# Patient Record
Sex: MALE | Race: WHITE
[De-identification: names, ages, dates, MRNs, and addresses within clinical notes are randomized per-mention and may not be internally consistent; named-entity substitution may affect disease eponyms.]

---

## 2020-05-07 ENCOUNTER — HOSPITAL ENCOUNTER (EMERGENCY)
Dept: HOSPITAL 47 - EC | Age: 40
Discharge: HOME | End: 2020-05-07
Payer: COMMERCIAL

## 2020-05-07 VITALS — TEMPERATURE: 98 F | HEART RATE: 98 BPM | SYSTOLIC BLOOD PRESSURE: 120 MMHG | DIASTOLIC BLOOD PRESSURE: 79 MMHG

## 2020-05-07 VITALS — RESPIRATION RATE: 18 BRPM

## 2020-05-07 DIAGNOSIS — R10.31: Primary | ICD-10-CM

## 2020-05-07 DIAGNOSIS — Z87.19: ICD-10-CM

## 2020-05-07 DIAGNOSIS — R59.0: ICD-10-CM

## 2020-05-07 DIAGNOSIS — B19.20: ICD-10-CM

## 2020-05-07 DIAGNOSIS — Z90.89: ICD-10-CM

## 2020-05-07 DIAGNOSIS — Z98.890: ICD-10-CM

## 2020-05-07 DIAGNOSIS — F17.200: ICD-10-CM

## 2020-05-07 PROCEDURE — 96372 THER/PROPH/DIAG INJ SC/IM: CPT

## 2020-05-07 PROCEDURE — 99284 EMERGENCY DEPT VISIT MOD MDM: CPT

## 2020-05-07 PROCEDURE — 76882 US LMTD JT/FCL EVL NVASC XTR: CPT

## 2020-05-07 NOTE — US
EXAMINATION TYPE: US groin RT

 

DATE OF EXAM: 5/7/2020

 

COMPARISON: NONE

 

CLINICAL HISTORY: severe pain, possible hernia. Severe pain x 2 months intermittently. Constant pain 
x 1 day. Possible hernia per order. Appendectomy in 2000.

 

Scanned area of pain right groin. Two hypoechoic areas seen with hyperechoic centers in the right kai
in. #1 measures: 0.7 x 0.8 x 0.6 cm.

#2 measures: 1.5 x 0.9 x 0.4 cm.

Scanned left groin for comparison. 

 

No other findings by ultrasound at this time.

 

 

Technologist marks two prominent but benign-appearing right groin lymph nodes. No concerning mass or 
fluid collection. No bowel containing hernia noted.

 

 

 

IMPRESSION:  As above.

## 2020-05-07 NOTE — ED
Abdominal Pain HPI





- General


Chief Complaint: Abdominal Pain


Stated Complaint: Groin Pain


Time Seen by Provider: 05/07/20 19:27


Source: patient


Mode of arrival: wheelchair


Limitations: no limitations





- History of Present Illness


Initial Comments: 





Patient is a 39-year-old male presenting to the emergency Department with 

complaints of right-sided lower quadrant pain has been increasing throughout 

today.  Patient states he has had mild pain in the same area for many years 

secondary to a small hernia.  Patient states the last month he feels like the 

pain has been increasing after he is lifting heavy things at work.  Patient 

states then today he has been having severe right groin pain and it is not going

away.  He denies fever, chills, vomiting, diarrhea.  He does admit to history of

appendectomy, no other abdominal surgeries.  He has no other complaints.  Upon 

arrival to the ER, patient was slightly tachycardia otherwise normal vitals.





- Related Data


                                    Allergies











Allergy/AdvReac Type Severity Reaction Status Date / Time


 


No Known Allergies Allergy   Verified 05/07/20 19:25














Review of Systems


ROS Statement: 


Those systems with pertinent positive or pertinent negative responses have been 

documented in the HPI.





ROS Other: All systems not noted in ROS Statement are negative.





Past Medical History


Additional Past Medical History / Comment(s): HEP C.


History of Any Multi-Drug Resistant Organisms: None Reported


Additional Past Surgical History / Comment(s): orthopedic surgery


Past Psychological History: No Psychological Hx Reported


Smoking Status: Current every day smoker


Past Alcohol Use History: None Reported


Past Drug Use History: None Reported





General Exam





- General Exam Comments


Initial Comments: 





GENERAL: 


Patient was teary eyed during exam, in mild distress.





HEAD: 


Atraumatic, normocephalic.





EYES:


Pupils equal round and reactive to light, extraocular movements intact, sclera 

anicteric, conjunctiva are normal.





ENT: 


TMs normal, nares patent, oropharynx clear without exudates.  Moist mucous 

membranes.





NECK: 


Normal range of motion, supple without lymphadenopathy or JVD.





LUNGS:


 Breath sounds clear to auscultation bilaterally and equal.  No wheezes rales or

rhonchi.





HEART:


Regular rate and rhythm without murmurs, rubs or gallops.





ABDOMEN: 


No abdominal tenderness.  Soft, normoactive bowel sounds.  No guarding, no 

rebound.  No masses appreciated.





: Tenderness into the right groin, no scrotal swelling or erythema.





EXTREMITIES: 


Normal range of motion, no pitting or edema.  No clubbing or cyanosis.





NEUROLOGICAL: 


Cranial nerves II through XII grossly intact.  Normal speech, normal gait.





PSYCH:


Normal mood, normal affect.





SKIN:


 Warm, Dry, normal turgor, no rashes or lesions noted.


Limitations: no limitations





Course


                                   Vital Signs











  05/07/20 05/07/20





  19:21 21:56


 


Temperature 98.4 F 98 F


 


Pulse Rate 102 H 98


 


Respiratory 18 18





Rate  


 


Blood Pressure 114/80 120/79


 


O2 Sat by Pulse 99 99





Oximetry  














Medical Decision Making





- Medical Decision Making





Patient is a 39-year-old male here for severe right groin pain as been 

increasing throughout today.  He has a history of a mild hernia for many years, 

as well as appendectomy.  Vitals are stable.  Ultrasound reveals 2 benign-

appearing right groin lymph nodes, no concerning mass or fluid collection.  No 

bowel containing hernia noted.  He was given pain control.  He states his pain 

is improved.  I discussed with patient that he needs to follow-up with surgery 

for possible hernia repair.  He is in agreement this plan.  He is stable for 

discharge.  Case discussed with Dr. Darby. 





Disposition


Clinical Impression: 


 Right groin pain





Disposition: HOME SELF-CARE


Condition: Stable


Instructions (If sedation given, give patient instructions):  Inguinal Hernia 

(ED)


Additional Instructions: 


Please return to the Emergency Department if symptoms worsen or any other 

concerns.


Is patient prescribed a controlled substance at d/c from ED?: No


Referrals: 


None,Stated [Primary Care Provider] - 1-2 days


Matt Parker MD [Medical Doctor] - 1-2 days

## 2020-05-12 ENCOUNTER — HOSPITAL ENCOUNTER (EMERGENCY)
Dept: HOSPITAL 47 - EC | Age: 40
Discharge: HOME | End: 2020-05-12
Payer: COMMERCIAL

## 2020-05-12 VITALS — DIASTOLIC BLOOD PRESSURE: 73 MMHG | RESPIRATION RATE: 19 BRPM | SYSTOLIC BLOOD PRESSURE: 122 MMHG | HEART RATE: 89 BPM

## 2020-05-12 VITALS — TEMPERATURE: 97.9 F

## 2020-05-12 DIAGNOSIS — N45.1: Primary | ICD-10-CM

## 2020-05-12 DIAGNOSIS — F17.200: ICD-10-CM

## 2020-05-12 LAB
ALBUMIN SERPL-MCNC: 4.2 G/DL (ref 3.5–5)
ALP SERPL-CCNC: 88 U/L (ref 38–126)
ALT SERPL-CCNC: 77 U/L (ref 4–49)
ANION GAP SERPL CALC-SCNC: 7 MMOL/L
AST SERPL-CCNC: 43 U/L (ref 17–59)
BASOPHILS # BLD AUTO: 0.1 K/UL (ref 0–0.2)
BASOPHILS NFR BLD AUTO: 1 %
BUN SERPL-SCNC: 13 MG/DL (ref 9–20)
CALCIUM SPEC-MCNC: 9.5 MG/DL (ref 8.4–10.2)
CHLORIDE SERPL-SCNC: 105 MMOL/L (ref 98–107)
CO2 SERPL-SCNC: 24 MMOL/L (ref 22–30)
EOSINOPHIL # BLD AUTO: 0.2 K/UL (ref 0–0.7)
EOSINOPHIL NFR BLD AUTO: 2 %
ERYTHROCYTE [DISTWIDTH] IN BLOOD BY AUTOMATED COUNT: 4.75 M/UL (ref 4.3–5.9)
ERYTHROCYTE [DISTWIDTH] IN BLOOD: 12.7 % (ref 11.5–15.5)
GLUCOSE SERPL-MCNC: 184 MG/DL (ref 74–99)
HCT VFR BLD AUTO: 42.9 % (ref 39–53)
HGB BLD-MCNC: 14.8 GM/DL (ref 13–17.5)
LYMPHOCYTES # SPEC AUTO: 2.2 K/UL (ref 1–4.8)
LYMPHOCYTES NFR SPEC AUTO: 27 %
MCH RBC QN AUTO: 31.1 PG (ref 25–35)
MCHC RBC AUTO-ENTMCNC: 34.4 G/DL (ref 31–37)
MCV RBC AUTO: 90.3 FL (ref 80–100)
MONOCYTES # BLD AUTO: 0.4 K/UL (ref 0–1)
MONOCYTES NFR BLD AUTO: 5 %
NEUTROPHILS # BLD AUTO: 5.2 K/UL (ref 1.3–7.7)
NEUTROPHILS NFR BLD AUTO: 64 %
PH UR: 8 [PH] (ref 5–8)
PLATELET # BLD AUTO: 330 K/UL (ref 150–450)
POTASSIUM SERPL-SCNC: 3.8 MMOL/L (ref 3.5–5.1)
PROT SERPL-MCNC: 6.9 G/DL (ref 6.3–8.2)
RBC UR QL: 2 /HPF (ref 0–5)
SODIUM SERPL-SCNC: 136 MMOL/L (ref 137–145)
SP GR UR: 1.01 (ref 1–1.03)
UROBILINOGEN UR QL STRIP: <2 MG/DL (ref ?–2)
WBC # BLD AUTO: 8.1 K/UL (ref 3.8–10.6)
WBC # UR AUTO: 4 /HPF (ref 0–5)

## 2020-05-12 PROCEDURE — 76870 US EXAM SCROTUM: CPT

## 2020-05-12 PROCEDURE — 85025 COMPLETE CBC W/AUTO DIFF WBC: CPT

## 2020-05-12 PROCEDURE — 81001 URINALYSIS AUTO W/SCOPE: CPT

## 2020-05-12 PROCEDURE — 83690 ASSAY OF LIPASE: CPT

## 2020-05-12 PROCEDURE — 96374 THER/PROPH/DIAG INJ IV PUSH: CPT

## 2020-05-12 PROCEDURE — 74177 CT ABD & PELVIS W/CONTRAST: CPT

## 2020-05-12 PROCEDURE — 93975 VASCULAR STUDY: CPT

## 2020-05-12 PROCEDURE — 83605 ASSAY OF LACTIC ACID: CPT

## 2020-05-12 PROCEDURE — 96375 TX/PRO/DX INJ NEW DRUG ADDON: CPT

## 2020-05-12 PROCEDURE — 99284 EMERGENCY DEPT VISIT MOD MDM: CPT

## 2020-05-12 PROCEDURE — 80053 COMPREHEN METABOLIC PANEL: CPT

## 2020-05-12 PROCEDURE — 36415 COLL VENOUS BLD VENIPUNCTURE: CPT

## 2020-05-12 PROCEDURE — 96361 HYDRATE IV INFUSION ADD-ON: CPT

## 2020-05-12 NOTE — CT
EXAMINATION TYPE: CT abdomen pelvis w con

 

DATE OF EXAM: 5/12/2020

 

COMPARISON: None

 

HISTORY: Abdominal pain x1 year

 

CT DLP: 743.9 mGycm

Automated exposure control for dose reduction was used.

 

CONTRAST: 

Performed with IV Contrast, patient injected with 100 mL of Isovue 300.

 

Lung bases are clear. There is no pleural effusion. Heart size is normal. There is no pericardial eff
usion.

 

Liver spleen stomach gallbladder pancreas appear normal. Bile ducts are not dilated.

 

There is no adrenal mass. Kidneys show satisfactory contrast opacification. There is no hydronephrosi
s. There is normal excretion on the delayed images. Bladder distends smoothly. There is no inguinal h
ernia. There is no free fluid in the pelvis.

 

There is no mesenteric edema. There is no ascites or free air. There is no sign of a bowel obstructio
n. Appendix is not seen. There is no sign of thickened appendix.

 

The lumbar vertebra have normal spacing and alignment. Posterior elements are intact. There is no com
pression fracture. The bony pelvis appears intact.

 

IMPRESSION:

Negative CT scan abdomen and pelvis.

## 2020-05-12 NOTE — ED
Abdominal Pain HPI





- General


Chief Complaint: Abdominal Pain


Stated Complaint: Abdominal Pain


Time Seen by Provider: 05/12/20 19:36


Source: patient, RN notes reviewed


Mode of arrival: wheelchair


Limitations: no limitations





- History of Present Illness


Initial Comments: 





39-year-old male presents emergency Department chief complaint of severe right 

testicular pain, right groin pain.  Patient states she was seen here few days 

ago was told that he had a hernia.  Patient states that today he's had worsening

waxing and waning pain.  Patient states that he has no dysuria no hematuria 

denies any history kidney stone.  Patient states she has some pain and rates up 

into his abdomen.  Denies any trauma today.  He's had a prior appendectomy 

return or who the surgeon was.  Patient denies any back pain.





- Related Data


                                  Previous Rx's











 Medication  Instructions  Recorded


 


Doxycycline Monohydrate [Monodox] 100 mg PO Q12HR #20 cap 05/12/20


 


Ibuprofen [Motrin] 600 mg PO Q8HR PRN #20 tab 05/12/20











                                    Allergies











Allergy/AdvReac Type Severity Reaction Status Date / Time


 


No Known Allergies Allergy   Verified 05/12/20 19:34














Review of Systems


ROS Statement: 


Those systems with pertinent positive or pertinent negative responses have been 

documented in the HPI.





ROS Other: All systems not noted in ROS Statement are negative.





Past Medical History


Additional Past Medical History / Comment(s): HEP C.


History of Any Multi-Drug Resistant Organisms: None Reported


Additional Past Surgical History / Comment(s): orthopedic surgery


Past Psychological History: No Psychological Hx Reported


Smoking Status: Current every day smoker


Past Alcohol Use History: None Reported


Past Drug Use History: None Reported





General Exam


Limitations: no limitations


General appearance: alert, in no apparent distress


Head exam: Present: atraumatic, normocephalic, normal inspection


Eye exam: Present: normal appearance, PERRL, EOMI.  Absent: scleral icterus, 

conjunctival injection, periorbital swelling


Respiratory exam: Present: normal lung sounds bilaterally.  Absent: respiratory 

distress, wheezes, rales, rhonchi, stridor


Cardiovascular Exam: Present: regular rate, normal rhythm, normal heart sounds. 

 Absent: systolic murmur, diastolic murmur, rubs, gallop, clicks


GI/Abdominal exam: Present: soft, tenderness, normal bowel sounds.  Absent: 

distended, guarding, rebound, rigid


 exam: Present: testicular tenderness (Right), scrotal swelling.  Absent: 

normal inspection


Back exam: Absent: CVA tenderness (R), CVA tenderness (L)


Neurological exam: Present: alert, oriented X3





Course


                                   Vital Signs











  05/12/20





  19:27


 


Temperature 97.9 F


 


Pulse Rate 102 H


 


Respiratory 20





Rate 


 


Blood Pressure 122/70


 


O2 Sat by Pulse 99





Oximetry 














Medical Decision Making





- Medical Decision Making





39-year-old male presented for severe right groin right testicular pain.  

Patient had recent ultrasound which was reviewed and negative for any acute 

findings in his groin.  Also scrotum shows enlarged epididymis otherwise normal 

blood flow.  CT obtained due to ongoing symptoms no acute findings.  Symptoms 

may related to epididymitis.  Patient will be provided antibiotics, pain control

 return parameters were discussed.





- Lab Data


Result diagrams: 


                                 05/12/20 19:56





                                 05/12/20 19:56


                                   Lab Results











  05/12/20 05/12/20 05/12/20 Range/Units





  19:56 19:56 19:56 


 


WBC  8.1    (3.8-10.6)  k/uL


 


RBC  4.75    (4.30-5.90)  m/uL


 


Hgb  14.8    (13.0-17.5)  gm/dL


 


Hct  42.9    (39.0-53.0)  %


 


MCV  90.3    (80.0-100.0)  fL


 


MCH  31.1    (25.0-35.0)  pg


 


MCHC  34.4    (31.0-37.0)  g/dL


 


RDW  12.7    (11.5-15.5)  %


 


Plt Count  330    (150-450)  k/uL


 


Neutrophils %  64    %


 


Lymphocytes %  27    %


 


Monocytes %  5    %


 


Eosinophils %  2    %


 


Basophils %  1    %


 


Neutrophils #  5.2    (1.3-7.7)  k/uL


 


Lymphocytes #  2.2    (1.0-4.8)  k/uL


 


Monocytes #  0.4    (0-1.0)  k/uL


 


Eosinophils #  0.2    (0-0.7)  k/uL


 


Basophils #  0.1    (0-0.2)  k/uL


 


Sodium   136 L   (137-145)  mmol/L


 


Potassium   3.8   (3.5-5.1)  mmol/L


 


Chloride   105   ()  mmol/L


 


Carbon Dioxide   24   (22-30)  mmol/L


 


Anion Gap   7   mmol/L


 


BUN   13   (9-20)  mg/dL


 


Creatinine   0.73   (0.66-1.25)  mg/dL


 


Est GFR (CKD-EPI)AfAm   >90   (>60 ml/min/1.73 sqM)  


 


Est GFR (CKD-EPI)NonAf   >90   (>60 ml/min/1.73 sqM)  


 


Glucose   184 H   (74-99)  mg/dL


 


Plasma Lactic Acid Jomar    1.9  (0.7-2.0)  mmol/L


 


Calcium   9.5   (8.4-10.2)  mg/dL


 


Total Bilirubin   0.5   (0.2-1.3)  mg/dL


 


AST   43   (17-59)  U/L


 


ALT   77 H   (4-49)  U/L


 


Alkaline Phosphatase   88   ()  U/L


 


Total Protein   6.9   (6.3-8.2)  g/dL


 


Albumin   4.2   (3.5-5.0)  g/dL


 


Lipase   84   ()  U/L


 


Urine Color     


 


Urine Appearance     (Clear)  


 


Urine pH     (5.0-8.0)  


 


Ur Specific Gravity     (1.001-1.035)  


 


Urine Protein     (Negative)  


 


Urine Glucose (UA)     (Negative)  


 


Urine Ketones     (Negative)  


 


Urine Blood     (Negative)  


 


Urine Nitrite     (Negative)  


 


Urine Bilirubin     (Negative)  


 


Urine Urobilinogen     (<2.0)  mg/dL


 


Ur Leukocyte Esterase     (Negative)  


 


Urine RBC     (0-5)  /hpf


 


Urine WBC     (0-5)  /hpf


 


Amorphous Sediment     (None)  /hpf














  05/12/20 Range/Units





  21:05 


 


WBC   (3.8-10.6)  k/uL


 


RBC   (4.30-5.90)  m/uL


 


Hgb   (13.0-17.5)  gm/dL


 


Hct   (39.0-53.0)  %


 


MCV   (80.0-100.0)  fL


 


MCH   (25.0-35.0)  pg


 


MCHC   (31.0-37.0)  g/dL


 


RDW   (11.5-15.5)  %


 


Plt Count   (150-450)  k/uL


 


Neutrophils %   %


 


Lymphocytes %   %


 


Monocytes %   %


 


Eosinophils %   %


 


Basophils %   %


 


Neutrophils #   (1.3-7.7)  k/uL


 


Lymphocytes #   (1.0-4.8)  k/uL


 


Monocytes #   (0-1.0)  k/uL


 


Eosinophils #   (0-0.7)  k/uL


 


Basophils #   (0-0.2)  k/uL


 


Sodium   (137-145)  mmol/L


 


Potassium   (3.5-5.1)  mmol/L


 


Chloride   ()  mmol/L


 


Carbon Dioxide   (22-30)  mmol/L


 


Anion Gap   mmol/L


 


BUN   (9-20)  mg/dL


 


Creatinine   (0.66-1.25)  mg/dL


 


Est GFR (CKD-EPI)AfAm   (>60 ml/min/1.73 sqM)  


 


Est GFR (CKD-EPI)NonAf   (>60 ml/min/1.73 sqM)  


 


Glucose   (74-99)  mg/dL


 


Plasma Lactic Acid Jomar   (0.7-2.0)  mmol/L


 


Calcium   (8.4-10.2)  mg/dL


 


Total Bilirubin   (0.2-1.3)  mg/dL


 


AST   (17-59)  U/L


 


ALT   (4-49)  U/L


 


Alkaline Phosphatase   ()  U/L


 


Total Protein   (6.3-8.2)  g/dL


 


Albumin   (3.5-5.0)  g/dL


 


Lipase   ()  U/L


 


Urine Color  Light Yellow  


 


Urine Appearance  Cloudy  (Clear)  


 


Urine pH  8.0  (5.0-8.0)  


 


Ur Specific Gravity  1.013  (1.001-1.035)  


 


Urine Protein  Negative  (Negative)  


 


Urine Glucose (UA)  Negative  (Negative)  


 


Urine Ketones  Negative  (Negative)  


 


Urine Blood  Negative  (Negative)  


 


Urine Nitrite  Negative  (Negative)  


 


Urine Bilirubin  Negative  (Negative)  


 


Urine Urobilinogen  <2.0  (<2.0)  mg/dL


 


Ur Leukocyte Esterase  Negative  (Negative)  


 


Urine RBC  2  (0-5)  /hpf


 


Urine WBC  4  (0-5)  /hpf


 


Amorphous Sediment  Rare H  (None)  /hpf














Disposition


Clinical Impression: 


 Right groin pain, Epididymitis





Disposition: HOME SELF-CARE


Condition: Stable


Instructions (If sedation given, give patient instructions):  Epididymitis (ED)


Additional Instructions: 


Please return to the Emergency Department if symptoms worsen or any other co

ncerns.


Prescriptions: 


Doxycycline Monohydrate [Monodox] 100 mg PO Q12HR #20 cap


Ibuprofen [Motrin] 600 mg PO Q8HR PRN #20 tab


 PRN Reason: Pain


Is patient prescribed a controlled substance at d/c from ED?: No


Referrals: 


None,Stated [Primary Care Provider] - 1-2 days


Time of Disposition: 21:53

## 2020-05-12 NOTE — US
EXAMINATION TYPE: US scrotum with doppler.  Grayscale and color Doppler Duplex imaging performed of t
jennifer scrotum.

 

DATE OF EXAM: 5/12/2020

 

COMPARISON: US Groin

 

CLINICAL HISTORY: Extreme right testicular pain. Extreme right testicular pain. Hx appendectomy.

 

 

EXAM MEASUREMENTS:

 

TESTICLES:

Right Testicle:  4.8 x 3.3 x 2.2 cm

Left Testicle:  4.6 x 3.1 x 2.4 cm

 

EPIDIDYMIS HEAD:

Right Epididymis:  1.2 x 1.5 x 0.8 cm

Left Epididymis:  1.2 x 1.7 x 0.7 cm  

 

Doppler performed to assess for testicular vascularity; bilateral color flow and waveforms are seen.

 

Presence of hydroceles:  Right measuring: 1.2 x 0.9 x 0.7 cm. 

Presence of varicoceles:  Vessels measure up to 1.5 mm on the right and 2.5 mm on the left.

 

Left testicle appears hyperechoic in comparison to right testicle.

Epididymis seen laterally appears enlarged and heterogeneous bilaterally.

Testicles appear slightly enlarged bilaterally.

 

 

 

 

 

IMPRESSION:

No testicular torsion or mass. Left and right epididymis are upper limit of normal size. I do not see
 an asymmetric abnormality. Epididymitis not excluded.

## 2020-07-26 ENCOUNTER — HOSPITAL ENCOUNTER (EMERGENCY)
Dept: HOSPITAL 47 - EC | Age: 40
Discharge: HOME | End: 2020-07-26
Payer: COMMERCIAL

## 2020-07-26 VITALS
DIASTOLIC BLOOD PRESSURE: 70 MMHG | RESPIRATION RATE: 16 BRPM | TEMPERATURE: 98.6 F | SYSTOLIC BLOOD PRESSURE: 108 MMHG | HEART RATE: 98 BPM

## 2020-07-26 DIAGNOSIS — M25.562: ICD-10-CM

## 2020-07-26 DIAGNOSIS — S89.92XA: Primary | ICD-10-CM
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
## 2020-07-26 PROCEDURE — 73562 X-RAY EXAM OF KNEE 3: CPT

## 2020-07-26 PROCEDURE — 99283 EMERGENCY DEPT VISIT LOW MDM: CPT

## 2020-07-26 PROCEDURE — 96372 THER/PROPH/DIAG INJ SC/IM: CPT

## 2020-07-26 NOTE — ED
General Adult HPI





- General


Chief complaint: Extremity Injury, Lower


Stated complaint: [facility] - lt knee injury


Time Seen by Provider: 07/26/20 18:44


Source: patient, RN notes reviewed


Mode of arrival: ambulatory


Limitations: no limitations





- History of Present Illness


Initial comments: 





Patient is a pleasant 39-year-old male presenting to the emergency Department 

with left knee pain.  Patient was at work yesterday when he turned and had 

sudden discomfort of his left knee.  Patient did feel a pop.  Patient states 

walking down the steps today he felt a second pop.  Patient has had significant 

discomfort.  Discomfort is mild at rest but moderate to severe with movement of 

the knee.  No history of chronic knee problems.  No other area of injury or 

concern.  No swelling.





- Related Data


                                  Previous Rx's











 Medication  Instructions  Recorded


 


Doxycycline Monohydrate [Monodox] 100 mg PO Q12HR #20 cap 05/12/20


 


Ibuprofen [Motrin] 600 mg PO Q8HR PRN #20 tab 05/12/20











                                    Allergies











Allergy/AdvReac Type Severity Reaction Status Date / Time


 


No Known Allergies Allergy   Verified 07/26/20 18:32














Review of Systems


ROS Statement: 


Those systems with pertinent positive or pertinent negative responses have been 

documented in the HPI.





ROS Other: All systems not noted in ROS Statement are negative.


Constitutional: Denies: fever


Eyes: Denies: eye pain


ENT: Denies: ear pain


Respiratory: Denies: cough


Cardiovascular: Denies: chest pain


Endocrine: Denies: fatigue


Gastrointestinal: Denies: abdominal pain


Genitourinary: Denies: dysuria


Musculoskeletal: Reports: as per HPI, arthralgia.  Denies: back pain


Skin: Denies: rash


Neurological: Denies: weakness





Past Medical History


Additional Past Medical History / Comment(s): HEP C.


History of Any Multi-Drug Resistant Organisms: None Reported


Additional Past Surgical History / Comment(s): orthopedic surgery


Past Psychological History: No Psychological Hx Reported


Past Alcohol Use History: None Reported


Past Drug Use History: None Reported





General Exam


Limitations: no limitations


General appearance: alert, in no apparent distress


Head exam: Present: normocephalic


Eye exam: Present: normal appearance


Neck exam: Present: normal inspection


Respiratory exam: Present: normal lung sounds bilaterally


Cardiovascular Exam: Present: regular rate, normal rhythm


  ** Expanded


Peripheral pulses: 2+: Dorsalis Pedis (L)


GI/Abdominal exam: Present: soft.  Absent: tenderness


Extremities exam: Present: tenderness (Tenderness in the region of the medial 

meniscus.), other (Distally the extremity is neurovascular intact.).  Absent: 

full ROM (Limited range of motion left knee secondary to pain.), calf tenderness


Neurological exam: Present: alert.  Absent: motor sensory deficit


Psychiatric exam: Present: normal affect, normal mood


Skin exam: Present: normal color





Course


                                   Vital Signs











  07/26/20





  18:29


 


Temperature 98.6 F


 


Pulse Rate 98


 


Respiratory 16





Rate 


 


Blood Pressure 108/70


 


O2 Sat by Pulse 97





Oximetry 














Medical Decision Making





- Medical Decision Making





Patient reevaluated and updated.  Knee immobilizer ordered.





- Radiology Data


Radiology results: image reviewed (X-ray left knee reveals no acute process)





Disposition


Clinical Impression: 


 Knee injury





Disposition: HOME SELF-CARE


Condition: Stable


Instructions (If sedation given, give patient instructions):  Knee Pain (ED)


Additional Instructions: 


Please follow-up [facility] in the next day or 2 for recheck.  Over-the-counter Motrin 

as needed.  Ice to affected area.  Use knee immobilizer, provided.  If symptoms 

continue you will likely need further evaluation including orthopedic evaluation

 or further imaging.  Return for increased pain, swelling, fever, worsening or 

change in symptoms or other concerns.


Is patient prescribed a controlled substance at d/c from ED?: No


Referrals: 


Bismark Chavez DO [Medical Doctor] - 1-2 days


Time of Disposition: 20:07

## 2020-07-26 NOTE — XR
EXAMINATION TYPE: XR knee complete LT

 

DATE OF EXAM: 7/26/2020

 

COMPARISON: NONE

 

HISTORY: Knee pain

 

TECHNIQUE: 3 views

 

FINDINGS: I see no fracture nor dislocation. Joint spaces are normal. There is no sign of knee joint 
effusion.

 

IMPRESSION: Negative left knee exam.

## 2021-01-22 ENCOUNTER — HOSPITAL ENCOUNTER (EMERGENCY)
Dept: HOSPITAL 47 - EC | Age: 41
Discharge: HOME | End: 2021-01-22
Payer: COMMERCIAL

## 2021-01-22 VITALS
HEART RATE: 122 BPM | RESPIRATION RATE: 18 BRPM | TEMPERATURE: 98.4 F | DIASTOLIC BLOOD PRESSURE: 80 MMHG | SYSTOLIC BLOOD PRESSURE: 128 MMHG

## 2021-01-22 DIAGNOSIS — S50.02XA: Primary | ICD-10-CM
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
## 2021-01-22 DIAGNOSIS — F17.200: ICD-10-CM

## 2021-01-22 PROCEDURE — 99283 EMERGENCY DEPT VISIT LOW MDM: CPT

## 2021-01-22 NOTE — XR
EXAMINATION TYPE: XR elbow complete LT

 

DATE OF EXAM: 1/22/2021

 

COMPARISON: NONE

 

HISTORY: Pain

 

FINDINGS: 

Three views of the elbow demonstrate no pathologic joint effusion.  The osseous structures are intact
.  There is no acute fracture or dislocation.  Tiny olecranon spur.

 

 

IMPRESSION:

1. No acute fracture or dislocation.  If symptoms persist follow-up study in 7 to 10 days could be ob
tained.

## 2021-01-22 NOTE — ED
Upper Extremity HPI





- General


Chief Complaint: Extremity Injury, Upper


Stated Complaint: slip & fall/elbow pain


Time Seen by Provider: 01/22/21 08:53


Source: patient, RN notes reviewed


Mode of arrival: ambulatory


Limitations: no limitations





- History of Present Illness


Initial Comments: 


40-year-old male presents emergency Department with chief complaint of left 

elbow injury.  Patient states that he slipped on her driveway yesterday falling 

landing onto his left elbow on landscaping bricks.  Patient states that it was 

sore but has worsened throughout the night.  Patient states he bumped it on door

this morning which increases pain, swelling.  Patient is right-hand dominant no 

prior left elbow injuries.   denies any paresthesias no other injuries 

noted from his fall.








- Related Data


                                  Previous Rx's











 Medication  Instructions  Recorded


 


Doxycycline Monohydrate [Monodox] 100 mg PO Q12HR #20 cap 05/12/20


 


Ibuprofen [Motrin] 600 mg PO Q8HR PRN #20 tab 05/12/20


 


Ibuprofen [Motrin] 600 mg PO Q8HR PRN #20 tab 01/22/21











                                    Allergies











Allergy/AdvReac Type Severity Reaction Status Date / Time


 


No Known Allergies Allergy   Verified 01/22/21 08:53














Review of Systems


ROS Statement: 


Those systems with pertinent positive or pertinent negative responses have been 

documented in the HPI.





ROS Other: All systems not noted in ROS Statement are negative.





Past Medical History


Additional Past Medical History / Comment(s): HEP C.


History of Any Multi-Drug Resistant Organisms: None Reported


Past Surgical History: Appendectomy


Additional Past Surgical History / Comment(s): orthopedic surgery


Past Psychological History: No Psychological Hx Reported


Smoking Status: Current every day smoker


Past Alcohol Use History: Rare


Past Drug Use History: Marijuana





General Exam


Limitations: no limitations


General appearance: alert, in no apparent distress


Head exam: Present: atraumatic, normocephalic, normal inspection


Eye exam: Present: normal appearance, PERRL, EOMI.  Absent: scleral icterus, 

conjunctival injection, periorbital swelling


ENT exam: Present: normal exam, normal oropharynx, mucous membranes moist


Neck exam: Present: normal inspection, full ROM.  Absent: tenderness, 

meningismus, lymphadenopathy


Respiratory exam: Present: normal lung sounds bilaterally.  Absent: respiratory 

distress, wheezes, rales, rhonchi, stridor


Cardiovascular Exam: Present: normal rhythm, tachycardia, normal heart sounds.  

Absent: systolic murmur, diastolic murmur, rubs, gallop, clicks


Extremities exam: Present: other (Left elbow there is moderate swelling, severe 

times with palpation, neurovascular intact left arm with equal radial pulses 

Refill less than 2 seconds of all digits, patient has limited range of motion of

 the left elbow secondary to pain there is no tenderness at the left distal 

forearm or shoulder.)





Course


                                   Vital Signs











  01/22/21





  08:46


 


Temperature 98.4 F


 


Pulse Rate 122 H


 


Respiratory 18





Rate 


 


Blood Pressure 128/80


 


O2 Sat by Pulse 100





Oximetry 














Medical Decision Making





- Medical Decision Making





X-ray was reviewed and read by radiologist as no acute fracture.  Patient to be 

discharged with anti-inflammatories will follow-up with orthopedics on Monday if

 no improvement return parameters were discussed.





Disposition


Clinical Impression: 


 Fall, Left elbow contusion





Disposition: HOME SELF-CARE


Condition: Stable


Instructions (If sedation given, give patient instructions):  Contusion in 

Adults (ED)


Additional Instructions: 


Please return to the Emergency Department if symptoms worsen or any other 

concerns.


Prescriptions: 


Ibuprofen [Motrin] 600 mg PO Q8HR PRN #20 tab


 PRN Reason: Pain


Is patient prescribed a controlled substance at d/c from ED?: No


Referrals: 


None,Stated [Primary Care Provider] - 1-2 days


Magno Mueller MD [STAFF PHYSICIAN] - 1-2 days


Time of Disposition: 09:27

## 2021-03-03 ENCOUNTER — HOSPITAL ENCOUNTER (EMERGENCY)
Dept: HOSPITAL 47 - EC | Age: 41
Discharge: HOME | End: 2021-03-03
Payer: COMMERCIAL

## 2021-03-03 VITALS — SYSTOLIC BLOOD PRESSURE: 117 MMHG | HEART RATE: 85 BPM | DIASTOLIC BLOOD PRESSURE: 81 MMHG | TEMPERATURE: 98.8 F

## 2021-03-03 VITALS — RESPIRATION RATE: 18 BRPM

## 2021-03-03 DIAGNOSIS — F17.200: ICD-10-CM

## 2021-03-03 DIAGNOSIS — F12.90: ICD-10-CM

## 2021-03-03 DIAGNOSIS — N39.0: Primary | ICD-10-CM

## 2021-03-03 DIAGNOSIS — Z90.49: ICD-10-CM

## 2021-03-03 LAB
PH UR: 6.5 [PH] (ref 5–8)
RBC UR QL: 13 /HPF (ref 0–5)
SP GR UR: 1.02 (ref 1–1.03)
UROBILINOGEN UR QL STRIP: 2 MG/DL (ref ?–2)
WBC # UR AUTO: >182 /HPF (ref 0–5)

## 2021-03-03 PROCEDURE — 81001 URINALYSIS AUTO W/SCOPE: CPT

## 2021-03-03 PROCEDURE — 87086 URINE CULTURE/COLONY COUNT: CPT

## 2021-03-03 PROCEDURE — 99283 EMERGENCY DEPT VISIT LOW MDM: CPT

## 2021-03-03 PROCEDURE — 87591 N.GONORRHOEAE DNA AMP PROB: CPT

## 2021-03-03 PROCEDURE — 87491 CHLMYD TRACH DNA AMP PROBE: CPT

## 2021-03-03 PROCEDURE — 96372 THER/PROPH/DIAG INJ SC/IM: CPT

## 2021-03-03 NOTE — ED
General Adult HPI





- General


Chief complaint: Urogenital


Stated complaint: STD check


Time Seen by Provider: 03/03/21 08:18


Source: patient, RN notes reviewed


Mode of arrival: ambulatory


Limitations: no limitations





- History of Present Illness


Initial comments: 


40-year-old male presents emergency Department chief complaint of dysuria.  

Patient states started 4 days ago.  Patient states she does have some penile 

drainage.  Patient states it burns constantly.  Patient does admit to 

unprotected sex and concerned about possible STD including gonorrhea and 

chlamydia.  Patient denies any lesions or sores on his penis.  Patient denies 

any other complaints no abdominal pain no flank pain no fevers or chills.





- Related Data


                                  Previous Rx's











 Medication  Instructions  Recorded


 


Ciprofloxacin HCl [Cipro] 500 mg PO Q12HR #20 tablet 03/03/21











                                    Allergies











Allergy/AdvReac Type Severity Reaction Status Date / Time


 


No Known Allergies Allergy   Verified 03/03/21 09:01














Review of Systems


ROS Statement: 


Those systems with pertinent positive or pertinent negative responses have been 

documented in the HPI.





ROS Other: All systems not noted in ROS Statement are negative.





Past Medical History


Additional Past Medical History / Comment(s): HEP C.


History of Any Multi-Drug Resistant Organisms: None Reported


Past Surgical History: Appendectomy, Orthopedic Surgery


Additional Past Surgical History / Comment(s): orthopedic surgery


Past Psychological History: No Psychological Hx Reported


Smoking Status: Current every day smoker


Past Alcohol Use History: Rare


Past Drug Use History: Marijuana





General Exam


Limitations: no limitations


General appearance: alert, in no apparent distress


Head exam: Present: atraumatic, normocephalic, normal inspection


Eye exam: Present: normal appearance, PERRL, EOMI.  Absent: scleral icterus, 

conjunctival injection, periorbital swelling


Neck exam: Present: normal inspection.  Absent: tenderness, meningismus, 

lymphadenopathy


Respiratory exam: Present: normal lung sounds bilaterally.  Absent: respiratory 

distress, wheezes, rales, rhonchi, stridor


Cardiovascular Exam: Present: regular rate, normal rhythm, normal heart sounds. 

 Absent: systolic murmur, diastolic murmur, rubs, gallop, clicks


GI/Abdominal exam: Present: soft, normal bowel sounds.  Absent: distended, 

tenderness, guarding, rebound, rigid


 exam: Present: urethral discharge, circumcision, other (Mild swelling just 

proximal to the glans, tattoo noted).  Absent: testicular tenderness, scrotal 

swelling, vertical testicular lie


Neurological exam: Present: alert


Skin exam: Present: warm, dry, intact, normal color.  Absent: rash





Course


                                   Vital Signs











  03/03/21 03/03/21





  08:11 09:16


 


Temperature 98.5 F 


 


Pulse Rate 100 


 


Respiratory 18 18





Rate  


 


Blood Pressure 134/85 


 


O2 Sat by Pulse 98 





Oximetry  














Medical Decision Making





- Medical Decision Making





Patient's urinalysis shows large amount of bacteria.  Patient did have some 

unprotected sex and concern for STDs.  Patient we treated for gonorrhea chlamydi

a.  Patient will be given 1 g for Rocephin for possible urinary tract infection.

  Patient will be discharged on ciprofloxacin return parameters discussed.





- Lab Data


                                   Lab Results











  03/03/21 Range/Units





  08:44 


 


Urine Color  Yellow  


 


Urine Appearance  Cloudy  (Clear)  


 


Urine pH  6.5  (5.0-8.0)  


 


Ur Specific Gravity  1.024  (1.001-1.035)  


 


Urine Protein  Trace H  (Negative)  


 


Urine Glucose (UA)  Negative  (Negative)  


 


Urine Ketones  Negative  (Negative)  


 


Urine Blood  Small H  (Negative)  


 


Urine Nitrite  Negative  (Negative)  


 


Urine Bilirubin  Negative  (Negative)  


 


Urine Urobilinogen  2.0  (<2.0)  mg/dL


 


Ur Leukocyte Esterase  Large H  (Negative)  


 


Urine RBC  13 H  (0-5)  /hpf


 


Urine WBC  >182 H  (0-5)  /hpf


 


Urine WBC Clumps  Few H  (None)  /hpf


 


Urine Mucus  Occasional H  (None)  /hpf


 


Urine Sperm  Rare  (None)  /hpf














Disposition


Clinical Impression: 


 UTI (urinary tract infection)





Disposition: HOME SELF-CARE


Condition: Stable


Instructions (If sedation given, give patient instructions):  Urinary Tract 

Infection in Men (ED)


Additional Instructions: 


Please return to the Emergency Department if symptoms worsen or any other 

concerns.


Prescriptions: 


Ciprofloxacin HCl [Cipro] 500 mg PO Q12HR #20 tablet


Is patient prescribed a controlled substance at d/c from ED?: No


Referrals: 


None,Stated [Primary Care Provider] - 1-2 days


Time of Disposition: 09:38

## 2021-04-06 ENCOUNTER — HOSPITAL ENCOUNTER (EMERGENCY)
Dept: HOSPITAL 47 - EC | Age: 41
Discharge: HOME | End: 2021-04-06
Payer: COMMERCIAL

## 2021-04-06 VITALS — RESPIRATION RATE: 36 BRPM | HEART RATE: 120 BPM | TEMPERATURE: 98.1 F

## 2021-04-06 VITALS — DIASTOLIC BLOOD PRESSURE: 105 MMHG | SYSTOLIC BLOOD PRESSURE: 166 MMHG

## 2021-04-06 DIAGNOSIS — F12.90: ICD-10-CM

## 2021-04-06 DIAGNOSIS — R10.30: Primary | ICD-10-CM

## 2021-04-06 DIAGNOSIS — F17.200: ICD-10-CM

## 2021-04-06 LAB
ALBUMIN SERPL-MCNC: 4.7 G/DL (ref 3.5–5)
ALP SERPL-CCNC: 87 U/L (ref 38–126)
ALT SERPL-CCNC: 66 U/L (ref 4–49)
AMYLASE SERPL-CCNC: 36 U/L (ref 30–110)
ANION GAP SERPL CALC-SCNC: 11 MMOL/L
AST SERPL-CCNC: 40 U/L (ref 17–59)
BASOPHILS # BLD AUTO: 0.1 K/UL (ref 0–0.2)
BASOPHILS NFR BLD AUTO: 1 %
BUN SERPL-SCNC: 10 MG/DL (ref 9–20)
CALCIUM SPEC-MCNC: 9.6 MG/DL (ref 8.4–10.2)
CHLORIDE SERPL-SCNC: 107 MMOL/L (ref 98–107)
CO2 SERPL-SCNC: 19 MMOL/L (ref 22–30)
EOSINOPHIL # BLD AUTO: 0.1 K/UL (ref 0–0.7)
EOSINOPHIL NFR BLD AUTO: 1 %
ERYTHROCYTE [DISTWIDTH] IN BLOOD BY AUTOMATED COUNT: 5.11 M/UL (ref 4.3–5.9)
ERYTHROCYTE [DISTWIDTH] IN BLOOD: 12.3 % (ref 11.5–15.5)
GLUCOSE SERPL-MCNC: 107 MG/DL (ref 74–99)
HCT VFR BLD AUTO: 44.5 % (ref 39–53)
HGB BLD-MCNC: 15.8 GM/DL (ref 13–17.5)
LIPASE SERPL-CCNC: 57 U/L (ref 23–300)
LYMPHOCYTES # SPEC AUTO: 2.5 K/UL (ref 1–4.8)
LYMPHOCYTES NFR SPEC AUTO: 27 %
MCH RBC QN AUTO: 31 PG (ref 25–35)
MCHC RBC AUTO-ENTMCNC: 35.5 G/DL (ref 31–37)
MCV RBC AUTO: 87.2 FL (ref 80–100)
MONOCYTES # BLD AUTO: 0.7 K/UL (ref 0–1)
MONOCYTES NFR BLD AUTO: 7 %
NEUTROPHILS # BLD AUTO: 5.7 K/UL (ref 1.3–7.7)
NEUTROPHILS NFR BLD AUTO: 62 %
PH UR: 6.5 [PH] (ref 5–8)
PLATELET # BLD AUTO: 441 K/UL (ref 150–450)
POTASSIUM SERPL-SCNC: 4.3 MMOL/L (ref 3.5–5.1)
PROT SERPL-MCNC: 7.9 G/DL (ref 6.3–8.2)
SODIUM SERPL-SCNC: 137 MMOL/L (ref 137–145)
SP GR UR: 1 (ref 1–1.03)
UROBILINOGEN UR QL STRIP: <2 MG/DL (ref ?–2)
WBC # BLD AUTO: 9.3 K/UL (ref 3.8–10.6)

## 2021-04-06 PROCEDURE — 87591 N.GONORRHOEAE DNA AMP PROB: CPT

## 2021-04-06 PROCEDURE — 87491 CHLMYD TRACH DNA AMP PROBE: CPT

## 2021-04-06 PROCEDURE — 83690 ASSAY OF LIPASE: CPT

## 2021-04-06 PROCEDURE — 87661 TRICHOMONAS VAGINALIS AMPLIF: CPT

## 2021-04-06 PROCEDURE — 83605 ASSAY OF LACTIC ACID: CPT

## 2021-04-06 PROCEDURE — 99284 EMERGENCY DEPT VISIT MOD MDM: CPT

## 2021-04-06 PROCEDURE — 93975 VASCULAR STUDY: CPT

## 2021-04-06 PROCEDURE — 76870 US EXAM SCROTUM: CPT

## 2021-04-06 PROCEDURE — 96374 THER/PROPH/DIAG INJ IV PUSH: CPT

## 2021-04-06 PROCEDURE — 96375 TX/PRO/DX INJ NEW DRUG ADDON: CPT

## 2021-04-06 PROCEDURE — 82150 ASSAY OF AMYLASE: CPT

## 2021-04-06 PROCEDURE — 76882 US LMTD JT/FCL EVL NVASC XTR: CPT

## 2021-04-06 PROCEDURE — 80053 COMPREHEN METABOLIC PANEL: CPT

## 2021-04-06 PROCEDURE — 96376 TX/PRO/DX INJ SAME DRUG ADON: CPT

## 2021-04-06 PROCEDURE — 36415 COLL VENOUS BLD VENIPUNCTURE: CPT

## 2021-04-06 PROCEDURE — 85025 COMPLETE CBC W/AUTO DIFF WBC: CPT

## 2021-04-06 PROCEDURE — 81003 URINALYSIS AUTO W/O SCOPE: CPT

## 2021-04-06 NOTE — ED
General Adult HPI





- General


Chief complaint: Abdominal Pain


Stated complaint: Groin Pain


Time Seen by Provider: 04/06/21 06:50


Source: patient, RN notes reviewed


Mode of arrival: ambulatory


Limitations: no limitations





- History of Present Illness


Initial comments: 





40-year-old male with a past medical history of hepatitis C presents to the 

emergency room for a chief complaint of right groin pain.  Patient reports this 

is an ongoing for several months.  Patient states it worsened in the past few 

days.  States that he went to [facility] and they told him nothing was wrong.  

Patient rates it does radiate into his right testicle.  States walking worsens 

his pain.  Patient was recently treated for gonorrhea and had a confirmed 

negative test result.  States he has not been with that partner again.Patient 

has no other complaints at this time including shortness of breath, chest pain, 

nausea or vomiting, headache, or visual changes.





- Related Data


                                Home Medications











 Medication  Instructions  Recorded  Confirmed


 


No Known Home Medications  04/06/21 04/06/21











                                    Allergies











Allergy/AdvReac Type Severity Reaction Status Date / Time


 


No Known Allergies Allergy   Verified 04/06/21 08:04














Review of Systems


ROS Statement: 


Those systems with pertinent positive or pertinent negative responses have been 

documented in the HPI.





ROS Other: All systems not noted in ROS Statement are negative.





Past Medical History


Additional Past Medical History / Comment(s): HEP C.


History of Any Multi-Drug Resistant Organisms: None Reported


Past Surgical History: Appendectomy, Orthopedic Surgery


Additional Past Surgical History / Comment(s): orthopedic surgery


Past Psychological History: No Psychological Hx Reported


Smoking Status: Current every day smoker


Past Alcohol Use History: None Reported, Rare


Past Drug Use History: Marijuana





General Exam


Limitations: no limitations


General appearance: alert


Head exam: Present: atraumatic, normocephalic, normal inspection


Eye exam: Present: normal appearance, PERRL, EOMI.  Absent: scleral icterus, 

conjunctival injection, periorbital swelling


ENT exam: Present: normal exam, mucous membranes moist


Neck exam: Present: normal inspection.  Absent: tenderness, meningismus, 

lymphadenopathy


Respiratory exam: Present: normal lung sounds bilaterally.  Absent: respiratory 

distress, wheezes, rales, rhonchi, stridor


Cardiovascular Exam: Present: regular rate, normal rhythm, normal heart sounds. 

Absent: systolic murmur, diastolic murmur, rubs, gallop, clicks


GI/Abdominal exam: Present: soft, tenderness (Right groin tenderness.  No 

abdominal tenderness.), normal bowel sounds.  Absent: guarding, rebound


Neurological exam: Present: alert





Course


                                   Vital Signs











  04/06/21





  06:02


 


Temperature 98.1 F


 


Pulse Rate 120 H


 


Respiratory 36 H





Rate 


 


Blood Pressure 166/105


 


O2 Sat by Pulse 98





Oximetry 














Medical Decision Making





- Medical Decision Making





Vitals are stable.  Patient initially tachycardic and tachypneic likely 

secondary to pain, HPI and physical exam as documented.  Patient does have some 

groin and testicular tenderness without erythema edema.  No evidence of hernia 

on exam.  CBC CMP unremarkable.  Lactic acid is normal.  I did do ultrasounds of

the groin and scrotum which were both normal.  Patient was given pain 

medication.  I did order a CAT scan to further evaluate which patient refused 

once CAT scan tech came to get him.  He states the pain is gone.  He does not 

want any further evaluation.  requesting discharge.  I did discuss returning if 

he has any worsening symptoms and otherwise following up with primary care.





- Lab Data


Result diagrams: 


                                 04/06/21 07:51





                                 04/06/21 07:51


                                   Lab Results











  04/06/21 04/06/21 04/06/21 Range/Units





  07:51 07:51 07:51 


 


WBC  9.3    (3.8-10.6)  k/uL


 


RBC  5.11    (4.30-5.90)  m/uL


 


Hgb  15.8    (13.0-17.5)  gm/dL


 


Hct  44.5    (39.0-53.0)  %


 


MCV  87.2    (80.0-100.0)  fL


 


MCH  31.0    (25.0-35.0)  pg


 


MCHC  35.5    (31.0-37.0)  g/dL


 


RDW  12.3    (11.5-15.5)  %


 


Plt Count  441    (150-450)  k/uL


 


MPV  7.0    


 


Neutrophils %  62    %


 


Lymphocytes %  27    %


 


Monocytes %  7    %


 


Eosinophils %  1    %


 


Basophils %  1    %


 


Neutrophils #  5.7    (1.3-7.7)  k/uL


 


Lymphocytes #  2.5    (1.0-4.8)  k/uL


 


Monocytes #  0.7    (0-1.0)  k/uL


 


Eosinophils #  0.1    (0-0.7)  k/uL


 


Basophils #  0.1    (0-0.2)  k/uL


 


Sodium   137   (137-145)  mmol/L


 


Potassium   4.3   (3.5-5.1)  mmol/L


 


Chloride   107   ()  mmol/L


 


Carbon Dioxide   19 L   (22-30)  mmol/L


 


Anion Gap   11   mmol/L


 


BUN   10   (9-20)  mg/dL


 


Creatinine   0.81   (0.66-1.25)  mg/dL


 


Est GFR (CKD-EPI)AfAm   >90   (>60 ml/min/1.73 sqM)  


 


Est GFR (CKD-EPI)NonAf   >90   (>60 ml/min/1.73 sqM)  


 


Glucose   107 H   (74-99)  mg/dL


 


Plasma Lactic Acid Jomar    1.8  (0.7-2.0)  mmol/L


 


Calcium   9.6   (8.4-10.2)  mg/dL


 


Total Bilirubin   1.2   (0.2-1.3)  mg/dL


 


AST   40   (17-59)  U/L


 


ALT   66 H   (4-49)  U/L


 


Alkaline Phosphatase   87   ()  U/L


 


Total Protein   7.9   (6.3-8.2)  g/dL


 


Albumin   4.7   (3.5-5.0)  g/dL


 


Amylase   36   ()  U/L


 


Lipase   57   ()  U/L


 


Urine Color     


 


Urine Appearance     (Clear)  


 


Urine pH     (5.0-8.0)  


 


Ur Specific Gravity     (1.001-1.035)  


 


Urine Protein     (Negative)  


 


Urine Glucose (UA)     (Negative)  


 


Urine Ketones     (Negative)  


 


Urine Blood     (Negative)  


 


Urine Nitrite     (Negative)  


 


Urine Bilirubin     (Negative)  


 


Urine Urobilinogen     (<2.0)  mg/dL


 


Ur Leukocyte Esterase     (Negative)  














  04/06/21 Range/Units





  09:02 


 


WBC   (3.8-10.6)  k/uL


 


RBC   (4.30-5.90)  m/uL


 


Hgb   (13.0-17.5)  gm/dL


 


Hct   (39.0-53.0)  %


 


MCV   (80.0-100.0)  fL


 


MCH   (25.0-35.0)  pg


 


MCHC   (31.0-37.0)  g/dL


 


RDW   (11.5-15.5)  %


 


Plt Count   (150-450)  k/uL


 


MPV   


 


Neutrophils %   %


 


Lymphocytes %   %


 


Monocytes %   %


 


Eosinophils %   %


 


Basophils %   %


 


Neutrophils #   (1.3-7.7)  k/uL


 


Lymphocytes #   (1.0-4.8)  k/uL


 


Monocytes #   (0-1.0)  k/uL


 


Eosinophils #   (0-0.7)  k/uL


 


Basophils #   (0-0.2)  k/uL


 


Sodium   (137-145)  mmol/L


 


Potassium   (3.5-5.1)  mmol/L


 


Chloride   ()  mmol/L


 


Carbon Dioxide   (22-30)  mmol/L


 


Anion Gap   mmol/L


 


BUN   (9-20)  mg/dL


 


Creatinine   (0.66-1.25)  mg/dL


 


Est GFR (CKD-EPI)AfAm   (>60 ml/min/1.73 sqM)  


 


Est GFR (CKD-EPI)NonAf   (>60 ml/min/1.73 sqM)  


 


Glucose   (74-99)  mg/dL


 


Plasma Lactic Acid Jomar   (0.7-2.0)  mmol/L


 


Calcium   (8.4-10.2)  mg/dL


 


Total Bilirubin   (0.2-1.3)  mg/dL


 


AST   (17-59)  U/L


 


ALT   (4-49)  U/L


 


Alkaline Phosphatase   ()  U/L


 


Total Protein   (6.3-8.2)  g/dL


 


Albumin   (3.5-5.0)  g/dL


 


Amylase   ()  U/L


 


Lipase   ()  U/L


 


Urine Color  Light Yellow  


 


Urine Appearance  Clear  (Clear)  


 


Urine pH  6.5  (5.0-8.0)  


 


Ur Specific Gravity  1.003  (1.001-1.035)  


 


Urine Protein  Negative  (Negative)  


 


Urine Glucose (UA)  Negative  (Negative)  


 


Urine Ketones  Negative  (Negative)  


 


Urine Blood  Negative  (Negative)  


 


Urine Nitrite  Negative  (Negative)  


 


Urine Bilirubin  Negative  (Negative)  


 


Urine Urobilinogen  <2.0  (<2.0)  mg/dL


 


Ur Leukocyte Esterase  Negative  (Negative)  














Disposition


Clinical Impression: 


 Right groin pain





Disposition: HOME SELF-CARE


Condition: Good


Instructions (If sedation given, give patient instructions):  Groin Pain (ED)


Additional Instructions: 


Please follow up with primary care in 1-2 days.  Return to the emergency room 

for any worsening symptoms.


Is patient prescribed a controlled substance at d/c from ED?: No


Referrals: 


Sudeep Eller [STAFF PHYSICIAN] - 1-2 days


Time of Disposition: 10:49

## 2021-04-06 NOTE — US
EXAMINATION TYPE: US groin RT

 

DATE OF EXAM: 4/6/2021

 

COMPARISON: NONE

 

CLINICAL HISTORY: pain. Pain

 

Scanned area of pain no abnormalities seen. 

 

IMPRESSION:  

1. Negative ultrasound right groin region

## 2021-04-06 NOTE — US
EXAMINATION TYPE: US scrotum with doppler.  Grayscale and color Doppler Duplex imaging performed of t
jennifer scrotum.

 

DATE OF EXAM: 4/6/2021

 

COMPARISON: NONE

 

CLINICAL HISTORY: pain. pain

 

 

EXAM MEASUREMENTS:

 

TESTICLES:

Right Testicle:  3.2 x 2.8 x 3.5 cm

Left Testicle:  3.7 x 2.6 x 3.3  cm

 

EPIDIDYMIS HEAD:

Right Epididymis:  .9 x .6 x .8  cm

Left Epididymis:  Not well visualized   

 

Doppler performed to assess for testicular vascularity; good bilateral color flow and waveforms are s
een.   There is no evidence of testicular torsion.

 

Presence of hydroceles:  No

Presence of varicoceles:  No

 

 

 

 

 

IMPRESSION: 

1. Normal scrotal ultrasound

## 2021-09-25 ENCOUNTER — HOSPITAL ENCOUNTER (EMERGENCY)
Dept: HOSPITAL 47 - EC | Age: 41
Discharge: HOME | End: 2021-09-25
Payer: COMMERCIAL

## 2021-09-25 VITALS — DIASTOLIC BLOOD PRESSURE: 76 MMHG | HEART RATE: 89 BPM | TEMPERATURE: 98.3 F | SYSTOLIC BLOOD PRESSURE: 110 MMHG

## 2021-09-25 VITALS — RESPIRATION RATE: 18 BRPM

## 2021-09-25 DIAGNOSIS — N50.811: Primary | ICD-10-CM

## 2021-09-25 DIAGNOSIS — F17.200: ICD-10-CM

## 2021-09-25 DIAGNOSIS — F12.90: ICD-10-CM

## 2021-09-25 DIAGNOSIS — Z90.49: ICD-10-CM

## 2021-09-25 DIAGNOSIS — Z86.19: ICD-10-CM

## 2021-09-25 LAB
ALBUMIN SERPL-MCNC: 4.4 G/DL (ref 3.5–5)
ALP SERPL-CCNC: 128 U/L (ref 38–126)
ALT SERPL-CCNC: 75 U/L (ref 4–49)
ANION GAP SERPL CALC-SCNC: 6 MMOL/L
AST SERPL-CCNC: 49 U/L (ref 17–59)
BASOPHILS # BLD AUTO: 0.1 K/UL (ref 0–0.2)
BASOPHILS NFR BLD AUTO: 1 %
BUN SERPL-SCNC: 9 MG/DL (ref 9–20)
CALCIUM SPEC-MCNC: 9.6 MG/DL (ref 8.4–10.2)
CHLORIDE SERPL-SCNC: 101 MMOL/L (ref 98–107)
CO2 SERPL-SCNC: 29 MMOL/L (ref 22–30)
EOSINOPHIL # BLD AUTO: 0.3 K/UL (ref 0–0.7)
EOSINOPHIL NFR BLD AUTO: 3 %
ERYTHROCYTE [DISTWIDTH] IN BLOOD BY AUTOMATED COUNT: 5.09 M/UL (ref 4.3–5.9)
ERYTHROCYTE [DISTWIDTH] IN BLOOD: 12.5 % (ref 11.5–15.5)
GLUCOSE SERPL-MCNC: 102 MG/DL (ref 74–99)
HCT VFR BLD AUTO: 45.8 % (ref 39–53)
HGB BLD-MCNC: 15.6 GM/DL (ref 13–17.5)
LYMPHOCYTES # SPEC AUTO: 2.1 K/UL (ref 1–4.8)
LYMPHOCYTES NFR SPEC AUTO: 24 %
MCH RBC QN AUTO: 30.6 PG (ref 25–35)
MCHC RBC AUTO-ENTMCNC: 34 G/DL (ref 31–37)
MCV RBC AUTO: 90 FL (ref 80–100)
MONOCYTES # BLD AUTO: 0.6 K/UL (ref 0–1)
MONOCYTES NFR BLD AUTO: 7 %
NEUTROPHILS # BLD AUTO: 5.4 K/UL (ref 1.3–7.7)
NEUTROPHILS NFR BLD AUTO: 63 %
PH UR: 6.5 [PH] (ref 5–8)
PLATELET # BLD AUTO: 380 K/UL (ref 150–450)
POTASSIUM SERPL-SCNC: 4.1 MMOL/L (ref 3.5–5.1)
PROT SERPL-MCNC: 7.6 G/DL (ref 6.3–8.2)
RBC UR QL: <1 /HPF (ref 0–5)
SODIUM SERPL-SCNC: 136 MMOL/L (ref 137–145)
SP GR UR: 1.01 (ref 1–1.03)
SQUAMOUS UR QL AUTO: <1 /HPF (ref 0–4)
UROBILINOGEN UR QL STRIP: <2 MG/DL (ref ?–2)
WBC # BLD AUTO: 8.6 K/UL (ref 3.8–10.6)
WBC # UR AUTO: 3 /HPF (ref 0–5)

## 2021-09-25 PROCEDURE — 80053 COMPREHEN METABOLIC PANEL: CPT

## 2021-09-25 PROCEDURE — 36415 COLL VENOUS BLD VENIPUNCTURE: CPT

## 2021-09-25 PROCEDURE — 96374 THER/PROPH/DIAG INJ IV PUSH: CPT

## 2021-09-25 PROCEDURE — 93975 VASCULAR STUDY: CPT

## 2021-09-25 PROCEDURE — 76870 US EXAM SCROTUM: CPT

## 2021-09-25 PROCEDURE — 99284 EMERGENCY DEPT VISIT MOD MDM: CPT

## 2021-09-25 PROCEDURE — 96361 HYDRATE IV INFUSION ADD-ON: CPT

## 2021-09-25 PROCEDURE — 81001 URINALYSIS AUTO W/SCOPE: CPT

## 2021-09-25 PROCEDURE — 85025 COMPLETE CBC W/AUTO DIFF WBC: CPT

## 2021-09-25 PROCEDURE — 96375 TX/PRO/DX INJ NEW DRUG ADDON: CPT

## 2021-09-25 PROCEDURE — 76882 US LMTD JT/FCL EVL NVASC XTR: CPT

## 2021-09-25 NOTE — ED
General Adult HPI





- General


Chief complaint: Abdominal Pain


Stated complaint: Groin Pain


Time Seen by Provider: 09/25/21 18:03


Source: patient, RN notes reviewed, old records reviewed


Mode of arrival: wheelchair


Limitations: no limitations





- History of Present Illness


Initial comments: 





40-year-old male patient, alert and oriented 4 presents to the emergency room 

with right groin pain and right testicle pain.  Patient states that he has had 

this in the past and was told he had an inguinal hernia.  He states that the 

pain this time is constant he cannot get comfortable.  He also states that he 

found a left testicular lump couple of weeks ago that is nontender.  He denies 

any dysuria.  He denies any penile discharge or risk of sexually transmitted 

disease.  He denies any fevers or nausea and vomiting.


Location: genitals (Testicle and groin), right


Consistency: constant


Improves with: none


Worsens with: other (Palpation)


Associated Symptoms: denies other symptoms


Treatments Prior to Arrival: none





- Related Data


                                  Previous Rx's











 Medication  Instructions  Recorded


 


Ibuprofen [Motrin] 600 mg PO Q8HR PRN #30 tab 09/25/21











                                    Allergies











Allergy/AdvReac Type Severity Reaction Status Date / Time


 


No Known Allergies Allergy   Verified 09/25/21 16:50














Review of Systems


ROS Statement: 


Those systems with pertinent positive or pertinent negative responses have been 

documented in the HPI.





ROS Other: All systems not noted in ROS Statement are negative.





Past Medical History


Additional Past Medical History / Comment(s): HEP C.


History of Any Multi-Drug Resistant Organisms: None Reported


Past Surgical History: Appendectomy, Orthopedic Surgery


Additional Past Surgical History / Comment(s): orthopedic surgery


Past Psychological History: No Psychological Hx Reported


Smoking Status: Current every day smoker


Past Alcohol Use History: None Reported, Rare


Past Drug Use History: Marijuana





General Exam


Limitations: no limitations


General appearance: alert, in no apparent distress


Head exam: Present: atraumatic, normocephalic, normal inspection


Eye exam: Present: normal appearance, PERRL, EOMI.  Absent: scleral icterus, 

conjunctival injection, periorbital swelling


ENT exam: Present: normal exam, normal oropharynx, mucous membranes moist


Neck exam: Present: normal inspection, full ROM.  Absent: tenderness, 

meningismus, lymphadenopathy


Respiratory exam: Present: normal lung sounds bilaterally.  Absent: respiratory 

distress, wheezes, rales, rhonchi, stridor


Cardiovascular Exam: Present: regular rate, normal rhythm, normal heart sounds. 

 Absent: systolic murmur, diastolic murmur, rubs, gallop, clicks


GI/Abdominal exam: Present: soft, normal bowel sounds.  Absent: distended, 

tenderness, guarding, rebound, rigid


 exam: Present: normal inspection, testicular tenderness (Right testicle), 

circumcision, other (Left testicular mass).  Absent: urethral discharge


External exam: Present: normal external exam.  Absent: erythema, swelling


Extremities exam: Present: normal inspection, full ROM, normal capillary refill.

  Absent: tenderness, pedal edema, joint swelling, calf tenderness


Back exam: Present: normal inspection, full ROM.  Absent: tenderness


Neurological exam: Present: alert, oriented X3, CN II-XII intact


Psychiatric exam: Present: normal affect, normal mood, anxious


Skin exam: Present: warm, dry, intact, normal color.  Absent: rash, cyanosis, 

diaphoretic





Course


                                   Vital Signs











  09/25/21 09/25/21





  16:50 20:29


 


Temperature 98.2 F 98.3 F


 


Pulse Rate 92 89


 


Respiratory 18 18





Rate  


 


Blood Pressure 108/72 110/76


 


O2 Sat by Pulse 98 98





Oximetry  














Medical Decision Making





- Medical Decision Making





Ultrasound of the scrotum shows no testicular torsion or mass.  There is a mild 

right-sided hydrocele.  There is no evidence of hernia.  Ultrasound of the right

 groin shows an ordinary appearing right inguinal lymph node demonstrated 

measuring 3 x 0.7 x 2.1.  Patient again denies any risk of sexually transmitted 

diseases, denies any dysuria or penile discharge.  He has been afebrile.  He 

does state that he has some pain relief.  He was advised to wear supportive 

underwear and follow-up with urology, return to the emergency room with any new 

or worsening symptoms.  Case discussed with Dr. Hall





- Lab Data


Result diagrams: 


                                 09/25/21 19:07 09/25/21 19:07


                                   Lab Results











  09/25/21 09/25/21 09/25/21 Range/Units





  19:07 19:07 19:07 


 


WBC  8.6    (3.8-10.6)  k/uL


 


RBC  5.09    (4.30-5.90)  m/uL


 


Hgb  15.6    (13.0-17.5)  gm/dL


 


Hct  45.8    (39.0-53.0)  %


 


MCV  90.0    (80.0-100.0)  fL


 


MCH  30.6    (25.0-35.0)  pg


 


MCHC  34.0    (31.0-37.0)  g/dL


 


RDW  12.5    (11.5-15.5)  %


 


Plt Count  380    (150-450)  k/uL


 


MPV  7.1    


 


Neutrophils %  63    %


 


Lymphocytes %  24    %


 


Monocytes %  7    %


 


Eosinophils %  3    %


 


Basophils %  1    %


 


Neutrophils #  5.4    (1.3-7.7)  k/uL


 


Lymphocytes #  2.1    (1.0-4.8)  k/uL


 


Monocytes #  0.6    (0-1.0)  k/uL


 


Eosinophils #  0.3    (0-0.7)  k/uL


 


Basophils #  0.1    (0-0.2)  k/uL


 


Sodium    136 L  (137-145)  mmol/L


 


Potassium    4.1  (3.5-5.1)  mmol/L


 


Chloride    101  ()  mmol/L


 


Carbon Dioxide    29  (22-30)  mmol/L


 


Anion Gap    6  mmol/L


 


BUN    9  (9-20)  mg/dL


 


Creatinine    0.83  (0.66-1.25)  mg/dL


 


Est GFR (CKD-EPI)AfAm    >90  (>60 ml/min/1.73 sqM)  


 


Est GFR (CKD-EPI)NonAf    >90  (>60 ml/min/1.73 sqM)  


 


Glucose    102 H  (74-99)  mg/dL


 


Calcium    9.6  (8.4-10.2)  mg/dL


 


Total Bilirubin    0.6  (0.2-1.3)  mg/dL


 


AST    49  (17-59)  U/L


 


ALT    75 H  (4-49)  U/L


 


Alkaline Phosphatase    128 H  ()  U/L


 


Total Protein    7.6  (6.3-8.2)  g/dL


 


Albumin    4.4  (3.5-5.0)  g/dL


 


Urine Color   Light Yellow   


 


Urine Appearance   Cloudy   (Clear)  


 


Urine pH   6.5   (5.0-8.0)  


 


Ur Specific Gravity   1.007   (1.001-1.035)  


 


Urine Protein   Trace H   (Negative)  


 


Urine Glucose (UA)   Negative   (Negative)  


 


Urine Ketones   Negative   (Negative)  


 


Urine Blood   Negative   (Negative)  


 


Urine Nitrite   Negative   (Negative)  


 


Urine Bilirubin   Negative   (Negative)  


 


Urine Urobilinogen   <2.0   (<2.0)  mg/dL


 


Ur Leukocyte Esterase   Negative   (Negative)  


 


Urine RBC   <1   (0-5)  /hpf


 


Urine WBC   3   (0-5)  /hpf


 


Ur Squamous Epith Cells   <1   (0-4)  /hpf


 


Amorphous Sediment   Moderate H   (None)  /hpf


 


Urine Bacteria   Occasional H   (None)  /hpf


 


Urine Mucus   Many H   (None)  /hpf














Disposition


Clinical Impression: 


 Testicular pain, right





Disposition: HOME SELF-CARE


Condition: Good


Instructions (If sedation given, give patient instructions):  Testicle Pain (ED)


Additional Instructions: 


Use Motrin as prescribed and warm compresses.  Wear underwear with scrotal 

elevation, no boxers.  Follow-up with urology for the continuation of care.  

Return to the emergency room with any new or worsening symptoms including 

increased pain, fevers or difficulty urinating.


Prescriptions: 


Ibuprofen [Motrin] 600 mg PO Q8HR PRN #30 tab


 PRN Reason: Pain


Is patient prescribed a controlled substance at d/c from ED?: No


Referrals: 


None,Stated [Primary Care Provider] - 1-2 days


Reynaldo Springer MD [STAFF PHYSICIAN] - 1-2 days


Time of Disposition: 20:12

## 2021-09-25 NOTE — US
EXAMINATION TYPE: US scrotum with doppler.  Grayscale and color Doppler Duplex imaging performed of jeanmarie rodriguez scrotum.

 

DATE OF EXAM: 9/25/2021

 

COMPARISON: NONE

 

CLINICAL HISTORY: pain. intermittent right groin and testicle pain for 2 years 

 

 

EXAM MEASUREMENTS:

 

TESTICLES:

Right Testicle:  4.1 x 2.5 x 3.4 cm

Left Testicle:  4.2 x 2.5 x 3.5 cm

 

EPIDIDYMIS HEAD:

Right Epididymis:  1.4 cm

Left Epididymis:  1.1 cm  

 

Doppler performed to assess for testicular vascularity; good bilateral color flow and waveforms are s
een.   There is no evidence of testicular torsion.

 

Presence of hydroceles:  yes, fluid collection adjacent right testicle = 3.8cm

Presence of varicoceles:  no

 

 

 

 

 

IMPRESSION:

No testicular torsion or mass. There is a mild right-sided hydrocele.

## 2021-09-25 NOTE — US
EXAMINATION TYPE: US groin RT

 

DATE OF EXAM: 9/25/2021

 

COMPARISON: NONE

 

CLINICAL HISTORY: pain. intermittent right groin/testicle pain for 2 years 

 

lymph node right groin = 3.0 x 0.7 x 2.1cm.

 

 

 

 

 

IMPRESSION:     No evidence of a hernia. There is ordinary appearing right inguinal lymph node demons
trated.

## 2022-07-04 ENCOUNTER — HOSPITAL ENCOUNTER (EMERGENCY)
Dept: HOSPITAL 47 - EC | Age: 42
LOS: 1 days | Discharge: HOME | End: 2022-07-05
Payer: COMMERCIAL

## 2022-07-04 VITALS — TEMPERATURE: 98.5 F | RESPIRATION RATE: 18 BRPM

## 2022-07-04 DIAGNOSIS — F17.200: ICD-10-CM

## 2022-07-04 DIAGNOSIS — T40.1X1A: Primary | ICD-10-CM

## 2022-07-04 PROCEDURE — 99283 EMERGENCY DEPT VISIT LOW MDM: CPT

## 2022-07-05 VITALS — SYSTOLIC BLOOD PRESSURE: 113 MMHG | DIASTOLIC BLOOD PRESSURE: 68 MMHG | HEART RATE: 88 BPM

## 2022-07-05 NOTE — ED
Overdose HPI





- General


Chief Complaint: Overdose


Stated Complaint: Overdose


Time Seen by Provider: 07/04/22 23:28


Source: patient, EMS


Mode of arrival: EMS


Limitations: no limitations





- History of Present Illness


Initial Comments: 


41-year-old male who presents to the emergency department after he overdosed on 

heroin.  The patient reports that he was at a friend's house when he injected a 

small amount of heroin into his right forearm.  He was with a friend who went 

unresponsive.  Other friends called EMS.  They found the patient to be somnolent

however was able to be aroused.  En route to the hospital the patient had a 

little bit of decrease in his saturations and therefore they gave him 2 g of IV 

narcan.  He denies that he was trying to harm himself.  States that he just 

wanted to get high.  No CPR was performed.  Patient complaining of any pain.  No

other alleviating, precipitator modifying factors








- Related Data


                                Home Medications











 Medication  Instructions  Recorded  Confirmed


 


No Known Home Medications  07/06/22 07/07/22











                                    Allergies











Allergy/AdvReac Type Severity Reaction Status Date / Time


 


No Known Allergies Allergy   Verified 07/07/22 01:38














Review of Systems


ROS Statement: 


Those systems with pertinent positive or pertinent negative responses have been 

documented in the HPI.





ROS Other: All systems not noted in ROS Statement are negative.





Past Medical History


Additional Past Medical History / Comment(s): HEP C.


History of Any Multi-Drug Resistant Organisms: None Reported


Past Surgical History: Appendectomy, Orthopedic Surgery


Additional Past Surgical History / Comment(s): orthopedic surgery


Past Psychological History: No Psychological Hx Reported


Smoking Status: Current every day smoker


Past Alcohol Use History: None Reported, Rare


Past Drug Use History: Marijuana





General Exam


Limitations: no limitations


General appearance: alert, in no apparent distress, anxious


Head exam: Present: atraumatic, normocephalic, normal inspection


Eye exam: Present: normal appearance, PERRL, EOMI.  Absent: scleral icterus, 

conjunctival injection, periorbital swelling


ENT exam: Present: normal exam, mucous membranes moist


Neck exam: Present: normal inspection.  Absent: tenderness, meningismus, 

lymphadenopathy


Respiratory exam: Present: normal lung sounds bilaterally.  Absent: respiratory 

distress, wheezes, rales, rhonchi, stridor


Cardiovascular Exam: Present: normal rhythm, tachycardia, normal heart sounds.  

Absent: systolic murmur, diastolic murmur, rubs, gallop, clicks


GI/Abdominal exam: Present: soft, normal bowel sounds.  Absent: distended, 

tenderness, guarding, rebound, rigid


Extremities exam: Present: normal inspection, full ROM, normal capillary refill.

 Absent: tenderness, pedal edema, joint swelling, calf tenderness


Back exam: Present: normal inspection


Neurological exam: Present: alert, oriented X3, CN II-XII intact


Psychiatric exam: Present: normal affect, normal mood


Skin exam: Present: warm, dry, intact, normal color.  Absent: rash





Course


                                   Vital Signs











  07/04/22 07/05/22





  23:27 00:54


 


Temperature 98.5 F 


 


Pulse Rate 112 H 88


 


Respiratory 18 18





Rate  


 


Blood Pressure 133/95 113/68


 


O2 Sat by Pulse 100 94 L





Oximetry  














Medical Decision Making





- Medical Decision Making





Upon arrival patient was placed into room 2.  Patient is awake and alert.  He is

monitored for an hour and a half without any sedation.  Patient is comfortable 

for discharge home.  Instructed not to use any drugs.  Follow up with his doctor

and return for any new or worsening symptoms.  Patient was discharged home in 

stable condition





Disposition


Clinical Impression: 


 Accidental drug overdose





Disposition: HOME SELF-CARE


Condition: Stable


Instructions (If sedation given, give patient instructions):  Adult Overdose 

(ED)


Is patient prescribed a controlled substance at d/c from ED?: No


Referrals: 


None,Stated [Primary Care Provider] - 1-2 days


Time of Disposition: 00:36


Decision to Admit Reason: Admit from EC


Decision Date: 07/05/22


Decision Time: 00:36

## 2022-07-06 ENCOUNTER — HOSPITAL ENCOUNTER (INPATIENT)
Dept: HOSPITAL 47 - EC | Age: 42
LOS: 8 days | Discharge: NURSING, REHAB OR LONG-TERM CARE | DRG: 880 | End: 2022-07-14
Attending: PSYCHIATRY & NEUROLOGY | Admitting: PSYCHIATRY & NEUROLOGY
Payer: COMMERCIAL

## 2022-07-06 DIAGNOSIS — Z28.21: ICD-10-CM

## 2022-07-06 DIAGNOSIS — F11.20: ICD-10-CM

## 2022-07-06 DIAGNOSIS — F15.20: ICD-10-CM

## 2022-07-06 DIAGNOSIS — Z59.00: ICD-10-CM

## 2022-07-06 DIAGNOSIS — Z63.5: ICD-10-CM

## 2022-07-06 DIAGNOSIS — Z56.0: ICD-10-CM

## 2022-07-06 DIAGNOSIS — Z59.9: ICD-10-CM

## 2022-07-06 DIAGNOSIS — Z71.51: ICD-10-CM

## 2022-07-06 DIAGNOSIS — Z71.89: ICD-10-CM

## 2022-07-06 DIAGNOSIS — Z71.41: ICD-10-CM

## 2022-07-06 DIAGNOSIS — F17.210: ICD-10-CM

## 2022-07-06 DIAGNOSIS — Z65.3: ICD-10-CM

## 2022-07-06 DIAGNOSIS — F99: Primary | ICD-10-CM

## 2022-07-06 DIAGNOSIS — F41.9: ICD-10-CM

## 2022-07-06 DIAGNOSIS — Z79.899: ICD-10-CM

## 2022-07-06 DIAGNOSIS — T40.2X2A: ICD-10-CM

## 2022-07-06 DIAGNOSIS — F32.9: ICD-10-CM

## 2022-07-06 DIAGNOSIS — G47.00: ICD-10-CM

## 2022-07-06 PROCEDURE — 80061 LIPID PANEL: CPT

## 2022-07-06 PROCEDURE — 80053 COMPREHEN METABOLIC PANEL: CPT

## 2022-07-06 PROCEDURE — 99285 EMERGENCY DEPT VISIT HI MDM: CPT

## 2022-07-06 PROCEDURE — 81001 URINALYSIS AUTO W/SCOPE: CPT

## 2022-07-06 PROCEDURE — 87635 SARS-COV-2 COVID-19 AMP PRB: CPT

## 2022-07-06 PROCEDURE — 80306 DRUG TEST PRSMV INSTRMNT: CPT

## 2022-07-06 PROCEDURE — 82075 ASSAY OF BREATH ETHANOL: CPT

## 2022-07-06 PROCEDURE — 84443 ASSAY THYROID STIM HORMONE: CPT

## 2022-07-06 PROCEDURE — 85025 COMPLETE CBC W/AUTO DIFF WBC: CPT

## 2022-07-06 PROCEDURE — 83036 HEMOGLOBIN GLYCOSYLATED A1C: CPT

## 2022-07-06 SDOH — SOCIAL STABILITY - SOCIAL INSECURITY: DISRUPTION OF FAMILY BY SEPARATION AND DIVORCE: Z63.5

## 2022-07-06 SDOH — ECONOMIC STABILITY - INCOME SECURITY: PROBLEM RELATED TO HOUSING AND ECONOMIC CIRCUMSTANCES, UNSPECIFIED: Z59.9

## 2022-07-06 SDOH — ECONOMIC STABILITY - INCOME SECURITY: UNEMPLOYMENT, UNSPECIFIED: Z56.0

## 2022-07-06 SDOH — ECONOMIC STABILITY - HOUSING INSECURITY: HOMELESSNESS UNSPECIFIED: Z59.00

## 2022-07-06 NOTE — ED
General Adult HPI





- General


Source: RN notes reviewed, old records reviewed





<Zac Daigle - Last Filed: 07/07/22 01:03>





- General


Source: patient


Mode of arrival: ambulatory


Limitations: no limitations





<Landry Darby - Last Filed: 07/07/22 16:05>





- General


Chief complaint: Psychiatric Symptoms


Stated complaint: Mental Health


Time Seen by Provider: 07/06/22 20:53





- History of Present Illness


Initial comments: 


Dictation was produced using dragon dictation software. please excuse any 

grammatical, word or spelling errors. 











Chief Complaint: 41-year-old male presents emergency department for suicidal 

ideation





History of Present Illness: 41-year-old male presents emergency department for 

suicidal ideation.  Patient states that he's been feeling suicidal for long 

time.  Does not have any specific plan.  He was recently in the emergency 

department yesterday for similar issue.  Patient denies any homicidal ideation. 

No visual auditory hallucinations.  Patient denies any medical complaints.








The ROS documented in this emergency department record has been reviewed and 

confirmed by me.  Those systems with pertinent positive or negative responses 

have been documented in the HPI.  All other systems are other negative and/or 

noncontributory.








PHYSICAL EXAM:


General Impression: Alert and oriented x3, not in acute distress


HEENT: Normocephalic atraumatic, extra-ocular movements intact, pupils equal and

reactive to light bilaterally, mucous membranes moist.


Cardiovascular: Heart regular rate and rhythm


Chest: Able to complete full sentences, no retractions, no tachypnea


Abdomen: abdomen soft, non-tender, non-distended, no organomegaly


Musculoskeletal: Pulses present and equal in all extremities, no peripheral 

edema


Motor:  no focal deficits noted


Neurological: CN II-XII grossly intact, no focal motor or sensory deficits noted


Skin: Intact with no visualized rashes


Psych: Normal affect and mood





ED course: 41-year-old male presents to the emergency department for suicidal 

ideation.  Signs upon arrival are within acceptable limits.  Physical 

examination is benign.  Patient medically cleared for EPS.





Patient evaluated by EPS and admitted to inpatient psychiatric unit.


 (Landry Darby)





- Related Data


                                Home Medications











 Medication  Instructions  Recorded  Confirmed


 


No Known Home Medications  07/06/22 07/07/22











                                    Allergies











Allergy/AdvReac Type Severity Reaction Status Date / Time


 


No Known Allergies Allergy   Verified 07/07/22 01:38














Review of Systems


ROS Other: All systems not noted in ROS Statement are negative.





<Zac Daigle - Last Filed: 07/07/22 01:03>


ROS Other: All systems not noted in ROS Statement are negative.





<Landry Darby - Last Filed: 07/07/22 16:05>


ROS Statement: 


Those systems with pertinent positive or pertinent negative responses have been 

documented in the HPI.








Past Medical History


Additional Past Medical History / Comment(s): HEP C.


History of Any Multi-Drug Resistant Organisms: None Reported


Past Surgical History: Appendectomy, Orthopedic Surgery


Additional Past Surgical History / Comment(s): orthopedic surgery, ankles, 

shoudler


Past Psychological History: No Psychological Hx Reported


Smoking Status: Current every day smoker


Past Alcohol Use History: None Reported


Past Drug Use History: Heroin, Marijuana, Methamphetamine





<Landry Darby - Last Filed: 07/07/22 16:05>





General Exam


General appearance: alert, in no apparent distress


Head exam: Present: atraumatic, normocephalic, normal inspection


Eye exam: Present: normal appearance, PERRL, EOMI.  Absent: scleral icterus, 

conjunctival injection, periorbital swelling


ENT exam: Present: normal exam, mucous membranes moist


Neck exam: Present: normal inspection.  Absent: tenderness, meningismus, 

lymphadenopathy


Respiratory exam: Present: normal lung sounds bilaterally.  Absent: respiratory 

distress, wheezes, rales, rhonchi, stridor


Cardiovascular Exam: Present: regular rate, normal rhythm, normal heart sounds. 

Absent: systolic murmur, diastolic murmur, rubs, gallop, clicks


GI/Abdominal exam: Present: soft, normal bowel sounds.  Absent: distended, 

tenderness, guarding, rebound, rigid


Extremities exam: Present: normal inspection, full ROM, normal capillary refill.

 Absent: tenderness, pedal edema, joint swelling, calf tenderness


Back exam: Present: normal inspection


Neurological exam: Present: alert, oriented X3, CN II-XII intact


Psychiatric exam: Present: normal affect, normal mood


Skin exam: Present: warm, dry, intact, normal color.  Absent: rash





<Zac Daigle - Last Filed: 07/07/22 01:03>


Limitations: no limitations





<Bayudan,Landry D - Last Filed: 07/07/22 16:05>





Course





<Zac Daigle - Last Filed: 07/07/22 01:03>


                                   Vital Signs











  07/06/22 07/07/22 07/07/22





  20:36 00:41 02:05


 


Temperature 97.7 F  97.6 F


 


Pulse Rate 93  


 


Pulse Rate [   86





Left Sitting   





Pulse Oximetery   





]   


 


Respiratory 16  18





Rate   


 


Blood Pressure 102/74  


 


Blood Pressure   108/65





[Right Arm   





Sitting]   


 


O2 Sat by Pulse 100 100 97





Oximetry   














- Reevaluation(s)


Reevaluation #1: 





07/07/22 01:04


Medical records reviewed (Zac Daigle)


Reevaluation #2: 





07/07/22 01:04


medically clear for psychiatric evaluation


 (Zac Daigle)





Medical Decision Making





<Zac Daigle - Last Filed: 07/07/22 01:03>





- Medical Decision Making





41 male seen and evaluated by psychiatry, patient will be admitted for 

psychiatric evaluation and treatment (Zac Daigle)





- Lab Data


                                   Lab Results











  07/07/22 07/07/22 07/07/22 Range/Units





  00:25 00:25 00:27 


 


Urine Color  Yellow    


 


Urine Appearance  Clear    (Clear)  


 


Urine pH  6.5    (5.0-8.0)  


 


Ur Specific Gravity  1.015    (1.001-1.035)  


 


Urine Protein  Negative    (Negative)  


 


Urine Glucose (UA)  Negative    (Negative)  


 


Urine Ketones  Negative    (Negative)  


 


Urine Blood  Negative    (Negative)  


 


Urine Nitrite  Negative    (Negative)  


 


Urine Bilirubin  Negative    (Negative)  


 


Urine Urobilinogen  <2.0    (<2.0)  mg/dL


 


Ur Leukocyte Esterase  Trace H    (Negative)  


 


Urine RBC  <1    (0-5)  /hpf


 


Urine WBC  2    (0-5)  /hpf


 


Urine Mucus  Moderate H    (None)  /hpf


 


Urine Opiates Screen   Not Detected   (NotDetected)  


 


Ur Oxycodone Screen   Not Detected   (NotDetected)  


 


Urine Methadone Screen   Not Detected   (NotDetected)  


 


Ur Propoxyphene Screen   Not Detected   (NotDetected)  


 


Ur Barbiturates Screen   Not Detected   (NotDetected)  


 


U Tricyclic Antidepress   Not Detected   (NotDetected)  


 


Ur Phencyclidine Scrn   Not Detected   (NotDetected)  


 


Ur Amphetamines Screen   Detected H   (NotDetected)  


 


U Methamphetamines Scrn   Not Detected   (NotDetected)  


 


U Benzodiazepines Scrn   Not Detected   (NotDetected)  


 


Urine Cocaine Screen   Not Detected   (NotDetected)  


 


U Marijuana (THC) Screen   Detected H   (NotDetected)  


 


Coronavirus (PCR)    Not Detected  (Not Detectd)  














Disposition


Is patient prescribed a controlled substance at d/c from ED?: No





<Zac Daigle B - Last Filed: 07/07/22 01:03>





<Landry Darby - Last Filed: 07/07/22 16:05>


Clinical Impression: 


 Acute anxiety, Depression, Suicidal ideation





Disposition: TRANSFER TO PSYCH HOSP/UNIT


Condition: Fair

## 2022-07-07 LAB
PH UR: 6.5 [PH] (ref 5–8)
RBC UR QL: <1 /HPF (ref 0–5)
SP GR UR: 1.01 (ref 1–1.03)
UROBILINOGEN UR QL STRIP: <2 MG/DL (ref ?–2)
WBC #/AREA URNS HPF: 2 /HPF (ref 0–5)

## 2022-07-07 RX ADMIN — IBUPROFEN PRN MG: 600 TABLET ORAL at 20:29

## 2022-07-07 RX ADMIN — SERTRALINE HYDROCHLORIDE SCH MG: 50 TABLET, FILM COATED ORAL at 14:16

## 2022-07-07 RX ADMIN — ACETAMINOPHEN PRN MG: 325 TABLET, FILM COATED ORAL at 17:24

## 2022-07-07 RX ADMIN — IBUPROFEN PRN MG: 600 TABLET ORAL at 14:16

## 2022-07-07 NOTE — P.HP
Psychiatric H&P





- .


H&P Date: 07/07/22


History & Physical: 


                                    Allergies











Allergy/AdvReac Type Severity Reaction Status Date / Time


 


No Known Allergies Allergy   Verified 07/07/22 01:38








                                   Vital Signs











Temp  97.6 F   07/07/22 02:05


 


Pulse  86   07/07/22 02:05


 


Resp  18   07/07/22 02:05


 


BP  108/65   07/07/22 02:05


 


Pulse Ox  97   07/07/22 02:05


 


FiO2      








                                 Intake & Output











 07/06/22 07/07/22 07/07/22





 18:59 06:59 18:59


 


Weight  64.9 kg 








                             Laboratory Last Values











Urine Color  Yellow   07/07/22  00:25    


 


Urine Appearance  Clear  (Clear)   07/07/22  00:25    


 


Urine pH  6.5  (5.0-8.0)   07/07/22  00:25    


 


Ur Specific Gravity  1.015  (1.001-1.035)   07/07/22  00:25    


 


Urine Protein  Negative  (Negative)   07/07/22  00:25    


 


Urine Glucose (UA)  Negative  (Negative)   07/07/22  00:25    


 


Urine Ketones  Negative  (Negative)   07/07/22  00:25    


 


Urine Blood  Negative  (Negative)   07/07/22  00:25    


 


Urine Nitrite  Negative  (Negative)   07/07/22  00:25    


 


Urine Bilirubin  Negative  (Negative)   07/07/22  00:25    


 


Urine Urobilinogen  <2.0 mg/dL (<2.0)   07/07/22  00:25    


 


Ur Leukocyte Esterase  Trace  (Negative)  H  07/07/22  00:25    


 


Urine RBC  <1 /hpf (0-5)   07/07/22  00:25    


 


Urine WBC  2 /hpf (0-5)   07/07/22  00:25    


 


Urine Mucus  Moderate /hpf (None)  H  07/07/22  00:25    


 


Urine Opiates Screen  Not Detected  (NotDetected)   07/07/22  00:25    


 


Ur Oxycodone Screen  Not Detected  (NotDetected)   07/07/22  00:25    


 


Urine Methadone Screen  Not Detected  (NotDetected)   07/07/22  00:25    


 


Ur Propoxyphene Screen  Not Detected  (NotDetected)   07/07/22  00:25    


 


Ur Barbiturates Screen  Not Detected  (NotDetected)   07/07/22  00:25    


 


U Tricyclic Antidepress  Not Detected  (NotDetected)   07/07/22  00:25    


 


Ur Phencyclidine Scrn  Not Detected  (NotDetected)   07/07/22  00:25    


 


Ur Amphetamines Screen  Detected  (NotDetected)  H  07/07/22  00:25    


 


U Methamphetamines Scrn  Not Detected  (NotDetected)   07/07/22  00:25    


 


U Benzodiazepines Scrn  Not Detected  (NotDetected)   07/07/22  00:25    


 


Urine Cocaine Screen  Not Detected  (NotDetected)   07/07/22  00:25    


 


U Marijuana (THC) Screen  Detected  (NotDetected)  H  07/07/22  00:25    


 


Coronavirus (PCR)  Not Detected  (Not Detectd)   07/07/22  00:27    











07/07/22 14:28


IDENTIFYING DATA: Patient is a 41-year-old  male, currently homeless is

 and has one son.  Currently unemployed.





HPI: Patient presented to the hospital with depression and suicidal ideations.  

Patient was seen one day prior in the ER for a opioid overdose and came back to 

the ER the next day.  Patient was admitted voluntarily to mental health unit.  

[facility]  Patient

has multiple tattoos, appeared to be disheveled in appearance and was fairly 

cooperative during the interview.  He stated that he "is tired of losing 

everything" and spoke about going through a divorce and also financial 

difficulties.  He stated that he is also having legal and custody issues were 

paying child support to his ex-wife.  He states that he was extradited to 

[place] and has not been doing well here since.  He states that he feels he 

cannot find a job and does not have a phone.  He claims that he does have 

anhedonia and hopelessness.  He is stating that he has anxiety as well.  

Decreased sleep. fair Appetite. Patient denies any current suicidal or homicidal

ideations intent or plan.  At this time patient denies any auditory or visual 

hallucinations.  Patient denies any flight of ideas racing thoughts and 

increased in goal directed behavior.  Patient admits to using recreational drugs

including methamphetamine occasionally, cigarettes daily, heroin occasionally.





PAST PSYCHIATRIC HISTORY: Patient states that he has a history of polysubstance 

abuse and depression/anxiety. Patient denies being on any psychiatric 

medications.  He claims that he was last psychiatrically hospitalized in 2015 to

the mental health unit.  Patient denies any psychiatric outpatient follow-up.  

He claims that he cut his wrist several times in the past.





PMH: Hepatitis C





ALLERGIES: as per EMR





CHEMICAL DEPENDENCY HISTORY: as per HPI





FAMILY PSYCHIATRIC/SUBSTANCE USE HISTORY:  denies





SOCIAL HISTORY: Patient was born and raised in [place].  He states 

that he dropped out of school in the seventh grade however returned back to get 

his GED.  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 

carpentry.  He claims that he did go to shelter in the past due to child support 

nonpayment.  He claims that he is homeless.  He is .  He has one son.





MENTAL STATUS EXAM: 


General Appearance: Patient appears to be thin, disheveled, multiple tattoos, 

stated age is alert, directable, and attempts to cooperate. Patient appears to 

have poor hygiene and grooming.


Behavior: Patient is seated without any agitated behavior.  Poor eye contact.


Speech: Patient's speech is fluent and nonpressured.  Soft tone.


Mood/Affect: Patient reports their mood is depressed and anxious, affect is 

congruent and constricted. 


Suicidality/Homicidality:  Patient denies having any homicidal ideation intent 

or plan. Denies any suicidal ideations intent or plan  


Perceptions: Patient denies any visual hallucinations and denies any auditory 

hallucinations


Though content/process: There is no evidence of any delusional thought content 

and thought process is linear and goal-directed.  [place]


Memory and concentration: AOX3, grossly intact for the purposes of this session.

Can spell "WORLD" backwards


Judgment and insight: poor





STRENGTHS/WEAKNESSES: strength is that patient is resilient. Weakness is that 

patient has poor judgment and is impulsive





INTELLECT: average





IMPRESSIONS: 


Major depressive disorder, without psychotic features


Methamphetamine use disorder


Opioid use disorder


Nicotine dependence





PLAN: 


-Patient is admitted under voluntary status to MHU for stabilization of 

psychiatric symptoms and safety. Patient has signed adult voluntary form and 

medication consent and is placed in patient's chart. 


-Medications : Will start patient on Zoloft 50 mg daily for mood/anxiety, 

trazodone 50 mg daily at bedtime for mood/insomnia.


-Haldol and Vistaril PRN for agitation/aggression


-Patient was counselled on substance abuse and desired to cut back on use


-Patient was informed of the risks, benefits and side effects of the medication 

and patient verbally consented to taking the medications. Patient signed med 

consent form and was placed in chart.


-Internal Medicine consult to perform medical evaluation and physical.


-NRT - nicotine patch


-SW on board for discharge planning. Encourage patient to participate in groups 

to work on coping skills.  We'll speak with patient about possibly going to 

rehab versus shelter.

## 2022-07-08 LAB
ALBUMIN SERPL-MCNC: 4.3 G/DL (ref 3.5–5)
ALP SERPL-CCNC: 78 U/L (ref 38–126)
ALT SERPL-CCNC: 54 U/L (ref 4–49)
ANION GAP SERPL CALC-SCNC: 7 MMOL/L
AST SERPL-CCNC: 36 U/L (ref 17–59)
BASOPHILS # BLD AUTO: 0.1 K/UL (ref 0–0.2)
BASOPHILS NFR BLD AUTO: 1 %
BUN SERPL-SCNC: 18 MG/DL (ref 9–20)
CALCIUM SPEC-MCNC: 9.6 MG/DL (ref 8.4–10.2)
CHLORIDE SERPL-SCNC: 103 MMOL/L (ref 98–107)
CHOLEST SERPL-MCNC: 212 MG/DL (ref 0–200)
CO2 SERPL-SCNC: 30 MMOL/L (ref 22–30)
EOSINOPHIL # BLD AUTO: 0.1 K/UL (ref 0–0.7)
EOSINOPHIL NFR BLD AUTO: 2 %
ERYTHROCYTE [DISTWIDTH] IN BLOOD BY AUTOMATED COUNT: 5.15 M/UL (ref 4.3–5.9)
ERYTHROCYTE [DISTWIDTH] IN BLOOD: 13.1 % (ref 11.5–15.5)
GLUCOSE SERPL-MCNC: 91 MG/DL (ref 74–99)
HCT VFR BLD AUTO: 46.9 % (ref 39–53)
HDLC SERPL-MCNC: 44.3 MG/DL (ref 40–60)
HGB BLD-MCNC: 15.7 GM/DL (ref 13–17.5)
LDLC SERPL CALC-MCNC: 141.3 MG/DL (ref 0–131)
LYMPHOCYTES # SPEC AUTO: 1.3 K/UL (ref 1–4.8)
LYMPHOCYTES NFR SPEC AUTO: 23 %
MCH RBC QN AUTO: 30.5 PG (ref 25–35)
MCHC RBC AUTO-ENTMCNC: 33.5 G/DL (ref 31–37)
MCV RBC AUTO: 91.1 FL (ref 80–100)
MONOCYTES # BLD AUTO: 0.3 K/UL (ref 0–1)
MONOCYTES NFR BLD AUTO: 5 %
NEUTROPHILS # BLD AUTO: 3.7 K/UL (ref 1.3–7.7)
NEUTROPHILS NFR BLD AUTO: 67 %
PLATELET # BLD AUTO: 327 K/UL (ref 150–450)
POTASSIUM SERPL-SCNC: 5 MMOL/L (ref 3.5–5.1)
PROT SERPL-MCNC: 7.4 G/DL (ref 6.3–8.2)
SODIUM SERPL-SCNC: 140 MMOL/L (ref 137–145)
TRIGL SERPL-MCNC: 132 MG/DL (ref 0–149)
VLDLC SERPL CALC-MCNC: 26.4 MG/DL (ref 5–40)
WBC # BLD AUTO: 5.4 K/UL (ref 3.8–10.6)

## 2022-07-08 RX ADMIN — IBUPROFEN PRN MG: 600 TABLET ORAL at 20:14

## 2022-07-08 RX ADMIN — ACETAMINOPHEN PRN MG: 325 TABLET, FILM COATED ORAL at 17:44

## 2022-07-08 RX ADMIN — ACETAMINOPHEN PRN MG: 325 TABLET, FILM COATED ORAL at 11:10

## 2022-07-08 RX ADMIN — IBUPROFEN PRN MG: 600 TABLET ORAL at 08:22

## 2022-07-08 RX ADMIN — SERTRALINE HYDROCHLORIDE SCH MG: 50 TABLET, FILM COATED ORAL at 08:21

## 2022-07-08 NOTE — P.PN
Progress Note - Text


Progress Note Date: 07/08/22





Interval History:


Patient was seen wandering the hallways and was directable and agreeable to annamarie price with writer in the office.  Patient appears to have mildly improving hygiene

today.  He states that he is feeling very anxious today and claimed that he just

got off the phone with his brother.  He states that they argued a bit over the 

phone about what's causing his problems.  He states that his brother believes 

that it is mostly drug related.  He claims that he does have depression and try 

to kill himself.  He states that his mood is mildly better however still has 

severe anxiety at this time.  He claims that he has gone to some groups.  States

that his appetite is fair.  He claims that he did not sleep well last night due 

to several interruptions. At this time patient denies any suicidal or homical 

ideations, intent or plan. Patient denies any auditory, visual hallucinations 

and denies any paranoia or delusions. Patient denies any side effects from the 

medications and has been compliant with meds. 





Mental Status Exam:


General Appearance: Patient appears to be thin, disheveled, multiple tattoos, 

stated age is alert, directable, and attempts to cooperate. Patient appears to 

have mildly improving hygiene and grooming.


Behavior: Patient is seated without any agitated behavior. improving eye 

contact.


Speech: Patient's speech is fluent and nonpressured. 


Mood/Affect: Patient reports their mood is depressed and anxious, affect is 

congruent and constricted. 


Suicidality/Homicidality:  Patient denies having any homicidal ideation intent 

or plan. Denies any suicidal ideations intent or plan  


Perceptions: Patient denies any visual hallucinations and denies any auditory 

hallucinations


Though content/process: There is no evidence of any delusional thought content 

and thought process is linear and goal-directed. 


Memory and concentration: AOX3, grossly intact for the purposes of this session


Judgment and insight: poor, improving mildly





IMPRESSIONS: 


Major depressive disorder, without psychotic features


Methamphetamine use disorder


Opioid use disorder


Nicotine dependence








Plan:


-Patient continues to meet criteria for inpatient psychiatric admission for 

symptom stabilization and safety. Patient has signed adult voluntary form and 

medication consent and was placed in patient's chart.


-Medications:  increase Zoloft 100 mg daily for mood/anxiety, increase trazodone

100 mg daily at bedtime for mood/insomnia. added benadryl 25 mg qhs prn for 

insomnia


-When necessary Ativan and Haldol for agitation/aggression.


-NRT - nicotine patch


-SW on board for discharge planning.  Encouraged the patient to participate in 

milieu.

## 2022-07-08 NOTE — P.CONS
History of Present Illness





- Reason for Consult


Consult date: 07/08/22





- History of Present Illness





The patient is a 41-year-old male with a PMH of polysubstance abuse presented to

the emergency room with complaints of depression and suicidal ideation.  He was 

admitted to the mental health unit where he was seen and evaluated.  The patient

reports that he has been struggling with the relationship with his child's 

mother and having to make child support payments.  Reports using multiple sub

stances regularly including heroin.  Reports occasional use of tobacco and 

alcohol.  Denies any physical complaints.  Denied experiencing fever, chills, 

cough, chest pain, shortness of breath, nausea, vomiting, abdominal pain, 

diarrhea.





Review of systems:


Pertinent positives and negatives as discussed in HPI, a complete review of 

systems was performed and all other systems are negative.





Physical examination:


General: non toxic, no distress, appears at stated age, normal weight


Derm: no unusual rashes/lesions no unusual ecchymoses, warm, dry


Head: atraumatic, normocephalic, symmetric


Eyes: EOMI, no lid lag, anicteric sclera, pupils equal round reactive to light


ENT: Nose and ears atraumatic, no thrush,  no pharyngeal erythema


Neck: No thyromegaly, no cervical lymphadenopathy, trachea midline, supple


Mouth: no lip lesion, mucus membranes moist


Cardiovascular: S1S2 reg, no murmur, positive posterior tibial pulse bilateral, 

no edema, capillary refill less than 2 seconds


Lungs: CTA bilateral, no rhonchi, no rales , no accessory muscle use


Abdominal: soft,  nontender to palpation, no guarding, no appreciable 

organomegaly, normal bowel sounds


Ext: no gross muscle atrophy,  muscle strength 5 out of 5 in all 4 extremities 

grossly, no contractures, 


Neuro:  CN II-XI grossly intact, light touch intact all 4 extremities, finger to

nose within normal limits,


Psych: Alert, oriented, appropriate affect 





Assessment/plan





Polysubstance abuse


-Advised on importance of cessation





Depression and suicidal ideation


-As per psychiatry





Thank you for allowing us to participate in the care of this patient.  We will 

follow peripherally.  Do not hesitate to contact us with questions.  Someone can

be reached from the [facility] hospitalist group at all hours of the day 

at [phone].





Past Medical History


Additional Past Medical History / Comment(s): HEP C.


History of Any Multi-Drug Resistant Organisms: None Reported


Past Surgical History: Appendectomy, Orthopedic Surgery


Additional Past Surgical History / Comment(s): orthopedic surgery, ankles, 

shoudler


Past Psychological History: No Psychological Hx Reported


Smoking Status: Current every day smoker


Past Alcohol Use History: None Reported


Past Drug Use History: Heroin, Marijuana, Methamphetamine





- Past Family History


  ** Mother


Family Medical History: Cancer





Medications and Allergies


                                Home Medications











 Medication  Instructions  Recorded  Confirmed  Type


 


No Known Home Medications  07/06/22 07/07/22 History








                                    Allergies











Allergy/AdvReac Type Severity Reaction Status Date / Time


 


No Known Allergies Allergy   Verified 07/07/22 01:38 no concerns

## 2022-07-09 RX ADMIN — IBUPROFEN PRN MG: 600 TABLET ORAL at 20:37

## 2022-07-09 RX ADMIN — ACETAMINOPHEN PRN MG: 325 TABLET, FILM COATED ORAL at 14:56

## 2022-07-09 RX ADMIN — SERTRALINE HYDROCHLORIDE SCH MG: 100 TABLET ORAL at 08:22

## 2022-07-09 RX ADMIN — IBUPROFEN PRN MG: 600 TABLET ORAL at 08:21

## 2022-07-09 NOTE — P.PN
Subjective


Progress Note Date: 07/09/22


Principal diagnosis: 





IMPRESSIONS: 


Major depressive disorder, without psychotic features


Methamphetamine use disorder


Opioid use disorder


Nicotine dependence





Subjective data:





Patient was seen while he was laying in bed and agreeable to speak with writer 

in the office.    He claims that he does have depression and try to kill 

himself.  He states that he tried to overdose on heroine He states that his mood

is mildly better however still has severe anxiety at this time. 


 He claims that he has gone to some groups. 


  He claims that he did not sleep well and that since in the hospital he is not 

been sleeping too well


. At this time patient denies any suicidal or homical ideations, intent or plan.




Patient denies any auditory, visual hallucinations and denies any paranoia or 

delusions. 


Patient denies any side effects from the medications and has been compliant with

meds. 


However he admits that he feels helpless and hopeless and that he has been 

homeless





Mental Status Exam:


General Appearance: Patient appears to be thin, disheveled, multiple tattoos, 

stated age is alert, and attempts to cooperate. 


Behavior: Patient is laying down without any agitated behavior. improving eye 

contact.


Speech: Patient's speech is fluent and nonpressured. 


Mood/Affect: Patient reports their mood is depressed and anxious, affect is 

congruent and constricted. 


Suicidality/Homicidality:  Patient denies having any homicidal ideation intent 

or plan. Denies any suicidal ideations intent or plan  


Perceptions: Patient denies any visual hallucinations and denies any auditory 

hallucinations


Though content/process: There is no evidence of any delusional thought content 

and thought process is linear and goal-directed. 


Memory and concentration: AOX3, grossly intact for the purposes of this session


Judgment and insight: poor, improving mildly





IMPRESSIONS: 


Major depressive disorder, without psychotic features


Methamphetamine use disorder


Opioid use disorder


Nicotine dependence








Plan:


-Patient continues to meet criteria for inpatient psychiatric admission for 

symptom stabilization and safety. Patient has signed adult voluntary form and 

medication consent and was placed in patient's chart.


-Medications:  increase Zoloft 100 mg daily for mood/anxiety, increase trazodone

100 mg daily at bedtime for mood/insomnia. added benadryl 25 mg qhs prn for 

insomnia


-When necessary Ativan and Haldol for agitation/aggression.


-NRT - nicotine patch


-SW on board for discharge planning.  Encouraged the patient to participate in 

milieu.





Filiberto Turner M.D.


7/9/2022





Objective





- Vital Signs


Vital signs: 


                                   Vital Signs











Temp  97.6 F   07/07/22 02:05


 


Pulse  86   07/07/22 02:05


 


Resp  18   07/07/22 02:05


 


BP  108/65   07/07/22 02:05


 


Pulse Ox  97   07/07/22 02:05


 


FiO2      














- Labs


CBC & Chem 7: 


                                 07/08/22 10:31





                                 07/08/22 10:31


Labs: 


                  Abnormal Lab Results - Last 24 Hours (Table)











  07/08/22 Range/Units





  10:31 


 


ALT  54 H  (4-49)  U/L


 


Cholesterol  212.00 H  (0..00)  mg/dL


 


LDL Cholesterol, Calc  141.3 H  (0.0-131.0)  mg/dL


 


TSH  0.131 L  (0.465-4.680)  mIU/L

## 2022-07-10 RX ADMIN — IBUPROFEN PRN MG: 600 TABLET ORAL at 08:21

## 2022-07-10 RX ADMIN — ACETAMINOPHEN PRN MG: 325 TABLET, FILM COATED ORAL at 17:16

## 2022-07-10 RX ADMIN — SERTRALINE HYDROCHLORIDE SCH MG: 100 TABLET ORAL at 08:21

## 2022-07-10 RX ADMIN — ACETAMINOPHEN PRN MG: 325 TABLET, FILM COATED ORAL at 10:34

## 2022-07-10 RX ADMIN — IBUPROFEN PRN MG: 600 TABLET ORAL at 20:46

## 2022-07-10 NOTE — P.PN
Subjective


Progress Note Date: 07/10/22


Principal diagnosis: 





IMPRESSIONS: 


Major depressive disorder, without psychotic features


Methamphetamine use disorder


Opioid use disorder


Nicotine dependence





Subjective data:





Patient was seen while walking in the hallway and agreeable to speak with writer

in the office.  


  He claims that he does have depression and that things can go anyway depending

on his future


He states that he finds the day-to-day client somewhat routine and purposeless 

and sometimes wonders why he needs all this or even bother


.  He states that he tried to overdose on heroine and feels that the drugs may 

be a way to kill his boredom


. At this time patient denies any suicidal or homical ideations, intent or plan.




Patient denies any auditory, visual hallucinations and denies any paranoia or 

delusions. 


Patient denies any side effects from the medications and has been compliant with

meds. 


However he admits that he feels helpless and hopeless and that he has been 

homeless





Mental Status Exam:


General Appearance: Patient appears to be thin, disheveled, multiple tattoos, 

stated age is alert, and attempts to cooperate. 


Behavior: Patient is laying down without any agitated behavior. improving eye 

contact.


Speech: Patient's speech is fluent and nonpressured. 


Mood/Affect: Patient reports their mood is depressed and anxious, affect is 

congruent and full range


Suicidality/Homicidality:  Patient denies having any homicidal ideation intent 

or plan. Denies any suicidal ideations intent or plan  


Perceptions: Patient denies any visual hallucinations and denies any auditory 

hallucinations


Though content/process: There is no evidence of any delusional thought content 

and thought process is linear and goal-directed. 


Memory and concentration: AOX3, grossly intact for the purposes of this session


Judgment and insight: poor, improving mildly





IMPRESSIONS: 


Major depressive disorder, without psychotic features


Methamphetamine use disorder


Opioid use disorder


Nicotine dependence








Plan:


-Patient continues to meet criteria for inpatient psychiatric admission for 

symptom stabilization and safety. Patient has signed adult voluntary form and 

medication consent and was placed in patient's chart.


-Medications:  increase Zoloft 100 mg daily for mood/anxiety, increase trazodone

100 mg daily at bedtime for mood/insomnia. added benadryl 25 mg qhs prn for 

insomnia


-When necessary Ativan and Haldol for agitation/aggression.


-NRT - nicotine patch


-SW on board for discharge planning.  Encouraged the patient to participate in 

milieu.





Filiberto Johnny M.D.


7/10/2022





Objective





- Vital Signs


Vital signs: 


                                   Vital Signs











Temp  98.1 F   07/10/22 06:47


 


Pulse  71   07/10/22 06:47


 


Resp  16   07/10/22 06:47


 


BP  104/62   07/10/22 06:47


 


Pulse Ox  99   07/10/22 06:47


 


FiO2      








                                 Intake & Output











 07/09/22 07/10/22 07/10/22





 18:59 06:59 18:59


 


Weight   66.7 kg














- Labs


CBC & Chem 7: 


                                 07/08/22 10:31





                                 07/08/22 10:31

## 2022-07-11 RX ADMIN — SERTRALINE HYDROCHLORIDE SCH MG: 100 TABLET ORAL at 09:24

## 2022-07-11 RX ADMIN — IBUPROFEN PRN MG: 600 TABLET ORAL at 09:24

## 2022-07-11 RX ADMIN — ACETAMINOPHEN PRN MG: 325 TABLET, FILM COATED ORAL at 14:37

## 2022-07-11 NOTE — PN
PROGRESS NOTE



DATE OF SERVICE:

07/11/2022



CHIEF COMPLAINT:

The patient was admitted for depression with suicidal thinking. He had done on opioid

overdose just prior to this admission.



INTERVAL HISTORY:

The patient had a quiet day yesterday. He comes out on the unit.  He attends groups and

seems to do well in the group process.  He engages with others.  He socializes well

with staff and peers.  He has been cooperative with all aspects of care.  He continues

with complaints of anxiety. It was documented that he slept 6 hours last night up until

0500 hours. Today he has been up and overall doing about the same.  He continues to go

to groups. He socializes.  He does acknowledge quite a bit of anxiety. It is noteworthy

that the patient is interested in going to [facility] upon discharge here.  He talked

at length about the critical issue of getting off all abusive substances.  He has a

significant history of use of alcohol, marijuana, methamphetamine and heroin, pretty

much all of which he is withdrawing from at present.  It is noteworthy that the patient

attained a GED.  He does have an interest in doing more education towards technical

opportunities such as welding or cabinet-making.  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

and feels that he has some skills and that it is a very satisfying outlet for him.  He

tolerates his psychotropic medications.



MENTAL STATUS:

Patient sat with some restlessness.  He had good eye contact.  He answered questions

appropriately.  His thoughts were clear, coherent and goal-directed.  He was

spontaneous and interactive.  His affect was anxious, his mood dysphoric. He was

moderately distressed.  There was no indication of thought disorder.  He denied

thoughts of harm.  He was oriented and alert.



ASSESSMENT:

I will continue the current diagnosis and general treatment plan. I discussed with the

patient that the most immediate issue impacting him is substance withdrawal issues.  We

discussed that while he is on an antidepressant, it is likely that he will gain little

benefit from that at least over the next 4 to 6 weeks until the more immediate effects

of withdrawal have dissipated.  I will start the patient on Zyprexa 5 mg 3 times a day.

The aim of Zyprexa is to help reduce physiologic stress response relating to acute

substance withdrawal.  I discussed the indication of Zyprexa.  We reviewed potential

side effects as well as concerns relating to metabolics and movement disorder issues.

I encouraged the patient to work to pursue the option of admission to [facility],

which would be a good first step towards recovery.  Noteworthy is that the patient has

some positive outlets in his life that he would choose to work towards, which would

also help him to get into a positive program of recovery.  We will focus on

stabilization and discharge planning.





GILDA / MIKEY: [id number] / Job#: [id number]

## 2022-07-12 VITALS — RESPIRATION RATE: 16 BRPM

## 2022-07-12 RX ADMIN — SERTRALINE HYDROCHLORIDE SCH MG: 100 TABLET ORAL at 08:17

## 2022-07-12 RX ADMIN — ACETAMINOPHEN PRN MG: 325 TABLET, FILM COATED ORAL at 21:18

## 2022-07-12 RX ADMIN — IBUPROFEN PRN MG: 600 TABLET ORAL at 15:58

## 2022-07-12 NOTE — P.PN
Progress Note - Text


Progress Note Date: 07/12/22





Interval History:


Patient was seen laying in his bed this morning and was directable and agreeable

to speak with writer in the office.  Patient claims that yesterday he was 

started on Zyprexa to help his anxiety and states that he was feeling tired 

after taking it.  He claims that he slept fairly throughout the night last 

night.  He states that his mood and anxiety been gradually improving.  He 

continues to have worries about relapsing on drugs and going to rehab.  He 

claims he called [facility] and is waiting for an intake date.  He states that

he is worried about relapsing.  He claims that he has been going to some groups.

fair Appetite. At this time patient denies any suicidal or homical ideations, 

intent or plan. Patient denies any auditory, visual hallucinations and denies 

any paranoia or delusions. Patient denies any side effects from the medications 

and has been compliant with meds. 





Mental Status Exam:


General Appearance: Patient appears to be thin, disheveled, multiple tattoos, 

stated age is alert, directable, and attempts to cooperate. Patient appears to 

have mildly improving hygiene and grooming.


Behavior: Patient is seated without any agitated behavior. improving eye 

contact.


Speech: Patient's speech is fluent and nonpressured. 


Mood/Affect: Patient reports their mood is improving mildly, affect is congruent

and constricted. 


Suicidality/Homicidality:  Patient denies having any homicidal ideation intent 

or plan. Denies any suicidal ideations intent or plan  


Perceptions: Patient denies any visual hallucinations and denies any auditory 

hallucinations


Though content/process: There is no evidence of any delusional thought content 

and thought process is linear and goal-directed. 


Memory and concentration: AOX3, grossly intact for the purposes of this session


Judgment and insight: poor, improving mildly





IMPRESSIONS: 


Major depressive disorder, without psychotic features


Methamphetamine use disorder


Opioid use disorder


Nicotine dependence








Plan:


-Patient continues to meet criteria for inpatient psychiatric admission for 

symptom stabilization and safety. Patient has signed adult voluntary form and 

medication consent and was placed in patient's chart.


-Medications:  increase Zoloft 150 mg daily for mood/anxiety, increase trazodone

150 mg daily at bedtime for mood/insomnia. benadryl 25 mg qhs prn for insomnia. 

Discontinued olanzapine due to oversedation.  Start BuSpar 10 mg 3 times a day 

when necessary for anxiety.


-When necessary Thorazine and Vistaril for agitation/aggression.


-NRT - nicotine patch


-SW on board for discharge planning.  Encouraged the patient to participate in 

milieu. patient will be discharged to  rehab once we have an intake date.

## 2022-07-13 RX ADMIN — IBUPROFEN PRN MG: 600 TABLET ORAL at 11:11

## 2022-07-13 RX ADMIN — SERTRALINE HYDROCHLORIDE SCH MG: 100 TABLET ORAL at 09:15

## 2022-07-13 RX ADMIN — ACETAMINOPHEN PRN MG: 325 TABLET, FILM COATED ORAL at 16:16

## 2022-07-13 RX ADMIN — IBUPROFEN PRN MG: 600 TABLET ORAL at 19:58

## 2022-07-13 NOTE — P.PN
Progress Note - Text


Progress Note Date: 07/13/22





Interval History:


Patient was seen wandering the hallways this morning and was directable and ag

reeable to speak with writer in the office.  Patient claims that he is still 

feeling anxious at times on the unit but states that he is trying to cope and is

mainly related to discharge planning. He states that he is still motivated to go

to rehab and states that he has an intake date set for tomorrow.  He claims that

he is worried about possible relapse if he gets discharged from the hospital too

early and not directly to rehab.  He claims that his mood and anxiety have been 

gradually improving.  He states that he was able to sleep throughout the night. 

Admits to a fair appetite.  At this time patient denies any suicidal or homical 

ideations, intent or plan. Patient denies any auditory, visual hallucinations 

and denies any paranoia or delusions. Patient denies any side effects from the m

edications and has been compliant with meds. 





Mental Status Exam:


General Appearance: Patient appears to be thin, disheveled, multiple tattoos, 

stated age is alert, directable, and attempts to cooperate. Patient appears to 

have mildly improving hygiene and grooming.


Behavior: Patient is seated without any agitated behavior. Improving eye 

contact.


Speech: Patient's speech is fluent and nonpressured. 


Mood/Affect: Patient reports their mood is improving mildly, affect is congruent

and constricted. 


Suicidality/Homicidality:  Patient denies having any homicidal ideation intent 

or plan. Denies any suicidal ideations intent or plan  


Perceptions: Patient denies any visual hallucinations and denies any auditory 

hallucinations


Though content/process: There is no evidence of any delusional thought content 

and thought process is linear and goal-directed. 


Memory and concentration: AOX3, grossly intact for the purposes of this session


Judgment and insight: poor, improving mildly





IMPRESSIONS: 


Major depressive disorder, without psychotic features


Methamphetamine use disorder


Opioid use disorder


Nicotine dependence





Plan:


-Patient continues to meet criteria for inpatient psychiatric admission for 

symptom stabilization and safety. Patient has signed adult voluntary form and 

medication consent and was placed in patient's chart.


-Medications: Zoloft 150 mg daily for mood/anxiety, increase trazodone 200 mg 

daily at bedtime for mood/insomnia. Benadryl 25 mg qhs prn for insomnia. 

increase BuSpar 20 mg 3 times a day when necessary for anxiety.


-When necessary Thorazine and Vistaril for agitation/aggression.


-NRT - nicotine patch


-SW on board for discharge planning. Encouraged the patient to participate in 

milieu. patient will be discharged to  rehab tomorrow for intake in the 

morning.

## 2022-07-14 VITALS — TEMPERATURE: 97.7 F | SYSTOLIC BLOOD PRESSURE: 111 MMHG | HEART RATE: 71 BPM | DIASTOLIC BLOOD PRESSURE: 79 MMHG

## 2022-07-14 RX ADMIN — SERTRALINE HYDROCHLORIDE SCH MG: 100 TABLET ORAL at 08:33

## 2022-07-14 RX ADMIN — ACETAMINOPHEN PRN MG: 325 TABLET, FILM COATED ORAL at 00:35

## 2022-07-14 RX ADMIN — IBUPROFEN PRN MG: 600 TABLET ORAL at 08:33

## 2022-07-14 NOTE — P.DS
Providers


Date of admission: 


07/07/22 01:26





Expected date of discharge: 07/14/22


Attending physician: 


Geovany Hernandez MD





Consults: 





                                        





07/07/22 01:36


Consult Physician Routine 


   Consulting Provider: [facility]


   Consult Reason/Comments: For H & P for Medical follows up


   Do you want consulting provider notified?: Yes











Primary care physician: 


Stated None








- Discharge Diagnosis(es)


(1) Major depressive disorder without psychotic features


Current Visit: Yes   Status: Acute   Priority: High   





(2) Methamphetamine use disorder, moderate


Current Visit: Yes   Status: Acute   Priority: High   





(3) Opioid use disorder, moderate, dependence


Current Visit: Yes   Status: Acute   Priority: High   





(4) Nicotine dependence


Current Visit: Yes   Status: Acute   Priority: Low   


Hospital Course: 





Admission HPI:


Admission note was completed by writer "Patient is a 41-year-old  male,

currently homeless is  and has one son.  Currently unemployed. Patient 

presented to the hospital with depression and suicidal ideations.  Patient was 

seen one day prior in the ER for a opioid overdose and came back to the ER the 

next day.  Patient was admitted voluntarily to mental health unit.  Patient was 

seen today laying in the bed and agreeable street director.  Patient has 

multiple tattoos, appeared to be disheveled in appearance and was fairly 

cooperative during the interview.  He stated that he "is tired of losing 

everything" and spoke about going through a divorce and also financial 

difficulties.  He stated that he is also having legal and custody issues were 

paying child support to his ex-wife.  He states that he was extradited to 

[place] and has not been doing well here since.  He states that he feels he 

cannot find a job and does not have a phone.  He claims that he does have 

anhedonia and hopelessness.  He is stating that he has anxiety as well.  

Decreased sleep. fair Appetite. Patient denies any current suicidal or homicidal

ideations intent or plan.  At this time patient denies any auditory or visual 

hallucinations.  Patient denies any flight of ideas racing thoughts and 

increased in goal directed behavior.  Patient admits to using recreational drugs

including methamphetamine occasionally, cigarettes daily, heroin occasionally."





Hospital course:


Upon admission to the unit patient was directable and agreeable to commence 

treatment and signed adult voluntary form .  Patient got along well with other 

patients on the unit and followed unit protocol.  Patient was compliant with the

medications and denied any side effects throughout hospital course.  Patient was

started on Zoloft 150 mg daily for mood/anxiety, trazodone increased to dose of 

200 mg daily at bedtime for mood/insomnia, Benadryl 25 mg daily at bedtime when 

necessary for insomnia, BuSpar 30 mg twice a day when necessary for anxiety..  

Patient spoke of his stressors and engaged in therapy both group and individual.

 Patient was also seen by medical team for history and physical exam.  

Throughout the course of the hospitalization patient gradually improved with 

regards to mood, anxiety, sleep and became more future oriented with improved 

insight and judgment. On the day of discharge patient denied any suicidal or 

homicidal ideations intent or plan denied any auditory or visual hallucinations.

Patient endorsed wanting to live for his sobriety and his family.  The patient 

denied any access to guns or weapons.  Patient denied any paranoia and did not 

endorse any delusions.  Patient does have a significant history of substance 

abuse and was counseled on abstaining from all substances including alcohol and 

marijuana.  Patient called access line and got an intake date set for day of 

discharge and will be discharged directly to [facility].  Patient was also 

counseled on the medications and need for regular compliance and was encouraged 

to follow-up with their outpatient appointment for mental health and also for 

primary care.   





Mental status exam:


General Appearance: Patient appears to be short in stature, multiple tattoos, 

stated age is alert, pleasant, and cooperative. Patient is in no acute distress 

and has improved hygiene and grooming 


Behavior: Patient is calmly seated without any agitated behavior.


Speech: Patient's speech is fluent and nonpressured. 


Mood/Affect: Patient reports their mood is "good", affect is congruent and 

euthymic. 


Suicidality/Homicidality:  Patient denies having any suicidal or homicidal 

ideation intent or plan.  


Perceptions: Patient denies any auditory or visual hallucinations.  


Though content/process: There is no evidence of any delusional thought content 

and thought process is linear and goal-directed. more future oriented


Memory and concentration: AOX3, grossly intact for the purposes of this session.

Can spell "WORLD" backwards correctly.


Judgment and insight: improved with guarded prognosis





Impression:


Major depressive disorder, without psychotic features


Methamphetamine use disorder moderate


Opioid use disorder moderate


Nicotine dependence





Plan:


-Continue with discharge today as patient has improved and stabilized 

psychiatrically and is not currently an imminent threat to himself and/or 

others. Patient will remain at chronically elevated risk for harm to self and/or

others due to his polysubstance abuse.


-Continue medications: Zoloft 150 mg daily for mood/physiatry, trazodone 200 mg 

daily at bedtime for mood/insomnia, Benadryl 25 mg daily at bedtime when 

necessary for insomnia, BuSpar 30 mg twice a day when necessary for anxiety.


-Patient was counseled on the need for medication compliance and appropriate 

follow-up at mental health and also primary care for medical issues.  Patient 

verbalized understanding and agreed.


-Social work to help arrange patient's discharge today to [facility].  

Social work also to arrange for patients follow up appointments with [facility] for 

psychiatric care along with follow up with primary care provider.


-Patient counseled on abstaining from recreational drugs and marijuana and 

alcohol. Was informed/educated on the adverse effects on their physical and 

mental health. Patient verbally agreed and understood. 


-Patient was instructed to return to the hospital or seek immediate medical care

if their psychiatric or medical symptoms do worsen or reoccur.








                                    Allergies











Allergy/AdvReac Type Severity Reaction Status Date / Time


 


No Known Allergies Allergy   Verified 07/07/22 01:38











                               Laboratory Results











WBC  5.4 k/uL (3.8-10.6)   07/08/22  10:31    


 


RBC  5.15 m/uL (4.30-5.90)   07/08/22  10:31    


 


Hgb  15.7 gm/dL (13.0-17.5)   07/08/22  10:31    


 


Hct  46.9 % (39.0-53.0)   07/08/22  10:31    


 


MCV  91.1 fL (80.0-100.0)   07/08/22  10:31    


 


MCH  30.5 pg (25.0-35.0)   07/08/22  10:31    


 


MCHC  33.5 g/dL (31.0-37.0)   07/08/22  10:31    


 


RDW  13.1 % (11.5-15.5)   07/08/22  10:31    


 


Plt Count  327 k/uL (150-450)   07/08/22  10:31    


 


MPV  7.4   07/08/22  10:31    


 


Neutrophils %  67 %  07/08/22  10:31    


 


Lymphocytes %  23 %  07/08/22  10:31    


 


Monocytes %  5 %  07/08/22  10:31    


 


Eosinophils %  2 %  07/08/22  10:31    


 


Basophils %  1 %  07/08/22  10:31    


 


Neutrophils #  3.7 k/uL (1.3-7.7)   07/08/22  10:31    


 


Lymphocytes #  1.3 k/uL (1.0-4.8)   07/08/22  10:31    


 


Monocytes #  0.3 k/uL (0-1.0)   07/08/22  10:31    


 


Eosinophils #  0.1 k/uL (0-0.7)   07/08/22  10:31    


 


Basophils #  0.1 k/uL (0-0.2)   07/08/22  10:31    


 


Sodium  140 mmol/L (137-145)   07/08/22  10:31    


 


Potassium  5.0 mmol/L (3.5-5.1)   07/08/22  10:31    


 


Chloride  103 mmol/L ()   07/08/22  10:31    


 


Carbon Dioxide  30 mmol/L (22-30)   07/08/22  10:31    


 


Anion Gap  7 mmol/L  07/08/22  10:31    


 


BUN  18 mg/dL (9-20)   07/08/22  10:31    


 


Creatinine  0.90 mg/dL (0.66-1.25)   07/08/22  10:31    


 


Est GFR (CKD-EPI)AfAm  >90  (>60 ml/min/1.73 sqM)   07/08/22  10:31    


 


Est GFR (CKD-EPI)NonAf  >90  (>60 ml/min/1.73 sqM)   07/08/22  10:31    


 


Glucose  91 mg/dL (74-99)   07/08/22  10:31    


 


Estimated Ave Glu mg/dL  103   07/08/22  10:31    


 


Hemoglobin A1c  5.2 % (0.0-6.0)   07/08/22  10:31    


 


Calcium  9.6 mg/dL (8.4-10.2)   07/08/22  10:31    


 


Total Bilirubin  1.2 mg/dL (0.2-1.3)   07/08/22  10:31    


 


AST  36 U/L (17-59)   07/08/22  10:31    


 


ALT  54 U/L (4-49)  H  07/08/22  10:31    


 


Alkaline Phosphatase  78 U/L ()   07/08/22  10:31    


 


Total Protein  7.4 g/dL (6.3-8.2)   07/08/22  10:31    


 


Albumin  4.3 g/dL (3.5-5.0)   07/08/22  10:31    


 


Triglycerides  132.00 mg/dL (0..00)   07/08/22  10:31    


 


Cholesterol  212.00 mg/dL (0..00)  H  07/08/22  10:31    


 


LDL Cholesterol, Calc  141.3 mg/dL (0.0-131.0)  H  07/08/22  10:31    


 


VLDL Cholesterol, Calc  26.40 mg/dL (5.00-40.00)   07/08/22  10:31    


 


HDL Cholesterol  44.30 mg/dL (40.00-60.00)   07/08/22  10:31    


 


Cholesterol/HDL Ratio  4.79 Ratio  07/08/22  10:31    


 


TSH  0.131 mIU/L (0.465-4.680)  L  07/08/22  10:31    


 


Urine Color  Yellow   07/07/22  00:25    


 


Urine Appearance  Clear  (Clear)   07/07/22  00:25    


 


Urine pH  6.5  (5.0-8.0)   07/07/22  00:25    


 


Ur Specific Gravity  1.015  (1.001-1.035)   07/07/22  00:25    


 


Urine Protein  Negative  (Negative)   07/07/22  00:25    


 


Urine Glucose (UA)  Negative  (Negative)   07/07/22  00:25    


 


Urine Ketones  Negative  (Negative)   07/07/22  00:25    


 


Urine Blood  Negative  (Negative)   07/07/22  00:25    


 


Urine Nitrite  Negative  (Negative)   07/07/22  00:25    


 


Urine Bilirubin  Negative  (Negative)   07/07/22  00:25    


 


Urine Urobilinogen  <2.0 mg/dL (<2.0)   07/07/22  00:25    


 


Ur Leukocyte Esterase  Trace  (Negative)  H  07/07/22  00:25    


 


Urine RBC  <1 /hpf (0-5)   07/07/22  00:25    


 


Urine WBC  2 /hpf (0-5)   07/07/22  00:25    


 


Urine Mucus  Moderate /hpf (None)  H  07/07/22  00:25    


 


Urine Opiates Screen  Not Detected  (NotDetected)   07/07/22  00:25    


 


Ur Oxycodone Screen  Not Detected  (NotDetected)   07/07/22  00:25    


 


Urine Methadone Screen  Not Detected  (NotDetected)   07/07/22  00:25    


 


Ur Propoxyphene Screen  Not Detected  (NotDetected)   07/07/22  00:25    


 


Ur Barbiturates Screen  Not Detected  (NotDetected)   07/07/22  00:25    


 


U Tricyclic Antidepress  Not Detected  (NotDetected)   07/07/22  00:25    


 


Ur Phencyclidine Scrn  Not Detected  (NotDetected)   07/07/22  00:25    


 


Ur Amphetamines Screen  Detected  (NotDetected)  H  07/07/22  00:25    


 


U Methamphetamines Scrn  Not Detected  (NotDetected)   07/07/22  00:25    


 


U Benzodiazepines Scrn  Not Detected  (NotDetected)   07/07/22  00:25    


 


Urine Cocaine Screen  Not Detected  (NotDetected)   07/07/22  00:25    


 


U Marijuana (THC) Screen  Detected  (NotDetected)  H  07/07/22  00:25    


 


Coronavirus (PCR)  Not Detected  (Not Detectd)   07/07/22  00:27    











                                   Vital Signs











Temp  97.7 F   07/14/22 01:15


 


Pulse  71   07/14/22 01:15


 


Resp  16   07/14/22 01:15


 


BP  111/79   07/14/22 01:15


 


Pulse Ox  99   07/12/22 06:43


 


FiO2      








                                 Intake & Output











 07/13/22 07/14/22 07/14/22





 18:59 06:59 18:59


 


Weight  66.7 kg 











Patient Condition at Discharge: Stable





Plan - Discharge Summary


Discharge Rx Participant: No


New Discharge Prescriptions: 


New


   Sertraline [Zoloft] 150 mg PO DAILY 30 Days  tab


   diphenhydrAMINE [Benadryl] 25 mg PO HS PRN 30 Days  cap


     PRN Reason: Insomnia


   busPIRone HCl [Buspar] 30 mg PO BID PRN 30 Days  tab


     PRN Reason: Anxiety


   traZODone HCL [Desyrel] 200 mg PO HS 30 Days  tab


   Ibuprofen [Motrin] 600 mg PO Q6H PRN 30 Days  tab


     PRN Reason: Moderate To Severe Pain





No Action


   No Known Home Medications 


Discharge Medication List





No Known Home Medications  07/06/22 [History]


Ibuprofen [Motrin] 600 mg PO Q6H PRN 30 Days  tab 07/14/22 [Rx]


Sertraline [Zoloft] 150 mg PO DAILY 30 Days  tab 07/14/22 [Rx]


busPIRone HCl [Buspar] 30 mg PO BID PRN 30 Days  tab 07/14/22 [Rx]


diphenhydrAMINE [Benadryl] 25 mg PO HS PRN 30 Days  cap 07/14/22 [Rx]


traZODone HCL [Desyrel] 200 mg PO HS 30 Days  tab 07/14/22 [Rx]








Follow up Appointment(s)/Referral(s): 


[facility] [Outside] - 07/14/22 9:45 am


[facility] [NON-STAFF] - 1 Week


Patient Instructions/Handouts:  How to Stop Smoking (DC), Depression (DC), 

Polysubstance Abuse (ED)


Activity/Diet/Wound Care/Special Instructions: 


Avoid the use of street drugs and alcohol.  Take all prescriptions as 

prescribed.  When you are in need of refills on your medications, please contact

your medical provider and/or outpatient psychiatrist to have this done.  Please 

go to scheduled outpatient appointment for aftercare treatment.  If symptoms 

return or become worse, call the crisis line at [phone] and/or go to the 

nearest emergency room for evaluation. 


Discharge Disposition: OTHER INSTITUTION NOT DEFINED
